# Patient Record
Sex: MALE | Race: WHITE | NOT HISPANIC OR LATINO | Employment: OTHER | URBAN - METROPOLITAN AREA
[De-identification: names, ages, dates, MRNs, and addresses within clinical notes are randomized per-mention and may not be internally consistent; named-entity substitution may affect disease eponyms.]

---

## 2018-07-24 ENCOUNTER — OFFICE VISIT CONVERTED (OUTPATIENT)
Dept: FAMILY MEDICINE CLINIC | Facility: CLINIC | Age: 68
End: 2018-07-24
Attending: PHYSICIAN ASSISTANT

## 2018-10-16 ENCOUNTER — CONVERSION ENCOUNTER (OUTPATIENT)
Dept: FAMILY MEDICINE CLINIC | Facility: CLINIC | Age: 68
End: 2018-10-16

## 2018-10-16 ENCOUNTER — OFFICE VISIT CONVERTED (OUTPATIENT)
Dept: FAMILY MEDICINE CLINIC | Facility: CLINIC | Age: 68
End: 2018-10-16
Attending: PHYSICIAN ASSISTANT

## 2018-10-24 ENCOUNTER — OFFICE VISIT CONVERTED (OUTPATIENT)
Dept: SURGERY | Facility: CLINIC | Age: 68
End: 2018-10-24
Attending: NURSE PRACTITIONER

## 2019-04-22 ENCOUNTER — CONVERSION ENCOUNTER (OUTPATIENT)
Dept: FAMILY MEDICINE CLINIC | Facility: CLINIC | Age: 69
End: 2019-04-22

## 2019-04-22 ENCOUNTER — OFFICE VISIT CONVERTED (OUTPATIENT)
Dept: FAMILY MEDICINE CLINIC | Facility: CLINIC | Age: 69
End: 2019-04-22
Attending: PHYSICIAN ASSISTANT

## 2019-04-30 ENCOUNTER — OFFICE VISIT CONVERTED (OUTPATIENT)
Dept: FAMILY MEDICINE CLINIC | Facility: CLINIC | Age: 69
End: 2019-04-30
Attending: PHYSICIAN ASSISTANT

## 2019-04-30 ENCOUNTER — CONVERSION ENCOUNTER (OUTPATIENT)
Dept: FAMILY MEDICINE CLINIC | Facility: CLINIC | Age: 69
End: 2019-04-30

## 2019-07-26 ENCOUNTER — OFFICE VISIT CONVERTED (OUTPATIENT)
Dept: FAMILY MEDICINE CLINIC | Facility: CLINIC | Age: 69
End: 2019-07-26
Attending: PHYSICIAN ASSISTANT

## 2019-08-13 ENCOUNTER — OFFICE VISIT CONVERTED (OUTPATIENT)
Dept: FAMILY MEDICINE CLINIC | Facility: CLINIC | Age: 69
End: 2019-08-13
Attending: PHYSICIAN ASSISTANT

## 2019-08-13 ENCOUNTER — CONVERSION ENCOUNTER (OUTPATIENT)
Dept: FAMILY MEDICINE CLINIC | Facility: CLINIC | Age: 69
End: 2019-08-13

## 2019-08-23 ENCOUNTER — OFFICE VISIT CONVERTED (OUTPATIENT)
Dept: FAMILY MEDICINE CLINIC | Facility: CLINIC | Age: 69
End: 2019-08-23
Attending: PHYSICIAN ASSISTANT

## 2019-08-23 ENCOUNTER — HOSPITAL ENCOUNTER (OUTPATIENT)
Dept: GENERAL RADIOLOGY | Facility: HOSPITAL | Age: 69
Discharge: HOME | End: 2019-08-23
Attending: PHYSICIAN ASSISTANT

## 2019-09-30 ENCOUNTER — OFFICE VISIT CONVERTED (OUTPATIENT)
Dept: NEUROLOGY | Facility: CLINIC | Age: 69
End: 2019-09-30
Attending: PSYCHIATRY & NEUROLOGY

## 2019-09-30 ENCOUNTER — OFFICE VISIT CONVERTED (OUTPATIENT)
Dept: FAMILY MEDICINE CLINIC | Facility: CLINIC | Age: 69
End: 2019-09-30
Attending: PHYSICIAN ASSISTANT

## 2019-10-01 ENCOUNTER — CONVERSION ENCOUNTER (OUTPATIENT)
Dept: CARDIOLOGY | Facility: CLINIC | Age: 69
End: 2019-10-01
Attending: INTERNAL MEDICINE

## 2019-10-18 ENCOUNTER — HOSPITAL ENCOUNTER (OUTPATIENT)
Dept: MRI IMAGING | Facility: HOSPITAL | Age: 69
Discharge: HOME | End: 2019-10-18
Attending: PSYCHIATRY & NEUROLOGY

## 2019-10-28 ENCOUNTER — CONVERSION ENCOUNTER (OUTPATIENT)
Dept: NEUROLOGY | Facility: CLINIC | Age: 69
End: 2019-10-28

## 2019-12-02 ENCOUNTER — CONVERSION ENCOUNTER (OUTPATIENT)
Dept: FAMILY MEDICINE CLINIC | Facility: CLINIC | Age: 69
End: 2019-12-02

## 2019-12-02 ENCOUNTER — OFFICE VISIT CONVERTED (OUTPATIENT)
Dept: FAMILY MEDICINE CLINIC | Facility: CLINIC | Age: 69
End: 2019-12-02
Attending: PHYSICIAN ASSISTANT

## 2019-12-04 ENCOUNTER — HOSPITAL ENCOUNTER (OUTPATIENT)
Dept: SLEEP MEDICINE | Facility: HOSPITAL | Age: 69
Discharge: HOME | End: 2019-12-04
Attending: PSYCHIATRY & NEUROLOGY

## 2019-12-06 ENCOUNTER — OFFICE VISIT CONVERTED (OUTPATIENT)
Dept: NEUROLOGY | Facility: CLINIC | Age: 69
End: 2019-12-06
Attending: PSYCHIATRY & NEUROLOGY

## 2019-12-19 ENCOUNTER — HOSPITAL ENCOUNTER (OUTPATIENT)
Dept: SLEEP MEDICINE | Facility: HOSPITAL | Age: 69
Discharge: HOME | End: 2019-12-19
Attending: PSYCHIATRY & NEUROLOGY

## 2020-02-18 ENCOUNTER — HOSPITAL ENCOUNTER (OUTPATIENT)
Dept: SLEEP MEDICINE | Facility: HOSPITAL | Age: 70
Discharge: HOME | End: 2020-02-18
Attending: PSYCHIATRY & NEUROLOGY

## 2020-04-09 ENCOUNTER — TELEMEDICINE CONVERTED (OUTPATIENT)
Dept: FAMILY MEDICINE CLINIC | Facility: CLINIC | Age: 70
End: 2020-04-09
Attending: PHYSICIAN ASSISTANT

## 2020-04-30 ENCOUNTER — TELEMEDICINE CONVERTED (OUTPATIENT)
Dept: FAMILY MEDICINE CLINIC | Facility: CLINIC | Age: 70
End: 2020-04-30
Attending: PHYSICIAN ASSISTANT

## 2020-05-04 ENCOUNTER — TELEMEDICINE CONVERTED (OUTPATIENT)
Dept: FAMILY MEDICINE CLINIC | Facility: CLINIC | Age: 70
End: 2020-05-04
Attending: PHYSICIAN ASSISTANT

## 2020-05-12 ENCOUNTER — OFFICE VISIT CONVERTED (OUTPATIENT)
Dept: FAMILY MEDICINE CLINIC | Facility: CLINIC | Age: 70
End: 2020-05-12
Attending: PHYSICIAN ASSISTANT

## 2020-05-12 ENCOUNTER — CONVERSION ENCOUNTER (OUTPATIENT)
Dept: FAMILY MEDICINE CLINIC | Facility: CLINIC | Age: 70
End: 2020-05-12

## 2020-08-14 ENCOUNTER — OFFICE VISIT CONVERTED (OUTPATIENT)
Dept: FAMILY MEDICINE CLINIC | Facility: CLINIC | Age: 70
End: 2020-08-14
Attending: PHYSICIAN ASSISTANT

## 2020-09-21 ENCOUNTER — OFFICE VISIT CONVERTED (OUTPATIENT)
Dept: FAMILY MEDICINE CLINIC | Facility: CLINIC | Age: 70
End: 2020-09-21
Attending: NURSE PRACTITIONER

## 2020-09-21 ENCOUNTER — HOSPITAL ENCOUNTER (OUTPATIENT)
Dept: GENERAL RADIOLOGY | Facility: HOSPITAL | Age: 70
Discharge: HOME | End: 2020-09-21
Attending: NURSE PRACTITIONER

## 2020-09-28 ENCOUNTER — HOSPITAL ENCOUNTER (OUTPATIENT)
Dept: PREADMISSION TESTING | Facility: HOSPITAL | Age: 70
Discharge: HOME | End: 2020-09-28
Attending: PODIATRIST

## 2020-09-28 ENCOUNTER — OFFICE VISIT CONVERTED (OUTPATIENT)
Dept: PODIATRY | Facility: CLINIC | Age: 70
End: 2020-09-28
Attending: PODIATRIST

## 2020-09-28 LAB — SARS-COV-2 RNA SPEC QL NAA+PROBE: NOT DETECTED

## 2020-09-29 ENCOUNTER — HOSPITAL ENCOUNTER (OUTPATIENT)
Dept: PREADMISSION TESTING | Facility: HOSPITAL | Age: 70
Discharge: HOME | End: 2020-09-29
Attending: PODIATRIST

## 2020-10-02 ENCOUNTER — HOSPITAL ENCOUNTER (OUTPATIENT)
Dept: PERIOP | Facility: HOSPITAL | Age: 70
Setting detail: HOSPITAL OUTPATIENT SURGERY
Discharge: HOME | End: 2020-10-02
Attending: PODIATRIST

## 2020-10-07 ENCOUNTER — CONVERSION ENCOUNTER (OUTPATIENT)
Dept: PODIATRY | Facility: CLINIC | Age: 70
End: 2020-10-07

## 2020-10-07 ENCOUNTER — OFFICE VISIT CONVERTED (OUTPATIENT)
Dept: PODIATRY | Facility: CLINIC | Age: 70
End: 2020-10-07
Attending: PODIATRIST

## 2020-10-21 ENCOUNTER — OFFICE VISIT CONVERTED (OUTPATIENT)
Dept: PODIATRY | Facility: CLINIC | Age: 70
End: 2020-10-21
Attending: PODIATRIST

## 2020-10-21 ENCOUNTER — HOSPITAL ENCOUNTER (OUTPATIENT)
Dept: GENERAL RADIOLOGY | Facility: HOSPITAL | Age: 70
Discharge: HOME | End: 2020-10-21
Attending: PODIATRIST

## 2020-11-04 ENCOUNTER — OFFICE VISIT CONVERTED (OUTPATIENT)
Dept: PODIATRY | Facility: CLINIC | Age: 70
End: 2020-11-04
Attending: PODIATRIST

## 2020-11-04 ENCOUNTER — HOSPITAL ENCOUNTER (OUTPATIENT)
Dept: GENERAL RADIOLOGY | Facility: HOSPITAL | Age: 70
Discharge: HOME | End: 2020-11-04
Attending: PODIATRIST

## 2020-11-12 ENCOUNTER — OFFICE VISIT CONVERTED (OUTPATIENT)
Dept: NEUROLOGY | Facility: CLINIC | Age: 70
End: 2020-11-12
Attending: PSYCHIATRY & NEUROLOGY

## 2020-11-18 ENCOUNTER — OFFICE VISIT CONVERTED (OUTPATIENT)
Dept: PODIATRY | Facility: CLINIC | Age: 70
End: 2020-11-18
Attending: PODIATRIST

## 2020-11-18 ENCOUNTER — HOSPITAL ENCOUNTER (OUTPATIENT)
Dept: GENERAL RADIOLOGY | Facility: HOSPITAL | Age: 70
Discharge: HOME | End: 2020-11-18
Attending: PODIATRIST

## 2020-11-18 ENCOUNTER — CONVERSION ENCOUNTER (OUTPATIENT)
Dept: PODIATRY | Facility: CLINIC | Age: 70
End: 2020-11-18

## 2020-12-09 ENCOUNTER — OFFICE VISIT CONVERTED (OUTPATIENT)
Dept: PODIATRY | Facility: CLINIC | Age: 70
End: 2020-12-09
Attending: PODIATRIST

## 2020-12-09 ENCOUNTER — HOSPITAL ENCOUNTER (OUTPATIENT)
Dept: GENERAL RADIOLOGY | Facility: HOSPITAL | Age: 70
Discharge: HOME | End: 2020-12-09
Attending: PODIATRIST

## 2021-02-15 ENCOUNTER — TELEMEDICINE CONVERTED (OUTPATIENT)
Dept: FAMILY MEDICINE CLINIC | Facility: CLINIC | Age: 71
End: 2021-02-15
Attending: PHYSICIAN ASSISTANT

## 2021-02-24 ENCOUNTER — HOSPITAL ENCOUNTER (OUTPATIENT)
Dept: LAB | Facility: HOSPITAL | Age: 71
Discharge: HOME | End: 2021-02-24
Attending: PHYSICIAN ASSISTANT

## 2021-02-24 LAB
ALBUMIN SERPL-MCNC: 4.2 G/DL (ref 3.5–5)
ALBUMIN/GLOB SERPL: 1.6 {RATIO} (ref 1.4–2.6)
ALP SERPL-CCNC: 71 U/L (ref 56–155)
ALT SERPL-CCNC: 17 U/L (ref 10–40)
ANION GAP SERPL CALC-SCNC: 16 MMOL/L (ref 8–19)
APPEARANCE UR: CLEAR
AST SERPL-CCNC: 20 U/L (ref 15–50)
BASOPHILS # BLD AUTO: 0.05 10*3/UL (ref 0–0.2)
BASOPHILS NFR BLD AUTO: 0.7 % (ref 0–3)
BILIRUB SERPL-MCNC: 0.51 MG/DL (ref 0.2–1.3)
BILIRUB UR QL: NEGATIVE
BUN SERPL-MCNC: 12 MG/DL (ref 5–25)
BUN/CREAT SERPL: 11 {RATIO} (ref 6–20)
CALCIUM SERPL-MCNC: 8.9 MG/DL (ref 8.7–10.4)
CHLORIDE SERPL-SCNC: 107 MMOL/L (ref 99–111)
CHOLEST SERPL-MCNC: 227 MG/DL (ref 107–200)
CHOLEST/HDLC SERPL: 6 {RATIO} (ref 3–6)
COLOR UR: YELLOW
CONV ABS IMM GRAN: 0.05 10*3/UL (ref 0–0.2)
CONV CO2: 22 MMOL/L (ref 22–32)
CONV COLLECTION SOURCE (UA): NORMAL
CONV IMMATURE GRAN: 0.7 % (ref 0–1.8)
CONV TOTAL PROTEIN: 6.9 G/DL (ref 6.3–8.2)
CONV UROBILINOGEN IN URINE BY AUTOMATED TEST STRIP: 1 {EHRLICHU}/DL (ref 0.1–1)
CREAT UR-MCNC: 1.11 MG/DL (ref 0.7–1.2)
DEPRECATED RDW RBC AUTO: 44.9 FL (ref 35.1–43.9)
EOSINOPHIL # BLD AUTO: 0.34 10*3/UL (ref 0–0.7)
EOSINOPHIL # BLD AUTO: 4.5 % (ref 0–7)
ERYTHROCYTE [DISTWIDTH] IN BLOOD BY AUTOMATED COUNT: 13 % (ref 11.6–14.4)
GFR SERPLBLD BASED ON 1.73 SQ M-ARVRAT: >60 ML/MIN/{1.73_M2}
GLOBULIN UR ELPH-MCNC: 2.7 G/DL (ref 2–3.5)
GLUCOSE SERPL-MCNC: 83 MG/DL (ref 70–99)
GLUCOSE UR QL: NEGATIVE MG/DL
HCT VFR BLD AUTO: 46.4 % (ref 42–52)
HDLC SERPL-MCNC: 38 MG/DL (ref 40–60)
HGB BLD-MCNC: 15 G/DL (ref 14–18)
HGB UR QL STRIP: NEGATIVE
KETONES UR QL STRIP: NEGATIVE MG/DL
LDLC SERPL CALC-MCNC: 162 MG/DL (ref 70–100)
LEUKOCYTE ESTERASE UR QL STRIP: NEGATIVE
LYMPHOCYTES # BLD AUTO: 2.06 10*3/UL (ref 1–5)
LYMPHOCYTES NFR BLD AUTO: 27.3 % (ref 20–45)
MCH RBC QN AUTO: 30.7 PG (ref 27–31)
MCHC RBC AUTO-ENTMCNC: 32.3 G/DL (ref 33–37)
MCV RBC AUTO: 94.9 FL (ref 80–96)
MONOCYTES # BLD AUTO: 0.77 10*3/UL (ref 0.2–1.2)
MONOCYTES NFR BLD AUTO: 10.2 % (ref 3–10)
NEUTROPHILS # BLD AUTO: 4.28 10*3/UL (ref 2–8)
NEUTROPHILS NFR BLD AUTO: 56.6 % (ref 30–85)
NITRITE UR QL STRIP: NEGATIVE
NRBC CBCN: 0 % (ref 0–0.7)
OSMOLALITY SERPL CALC.SUM OF ELEC: 291 MOSM/KG (ref 273–304)
PH UR STRIP.AUTO: 7 [PH] (ref 5–8)
PLATELET # BLD AUTO: 184 10*3/UL (ref 130–400)
PMV BLD AUTO: 10.6 FL (ref 9.4–12.4)
POTASSIUM SERPL-SCNC: 4.1 MMOL/L (ref 3.5–5.3)
PROT UR QL: NEGATIVE MG/DL
PSA SERPL-MCNC: 1.12 NG/ML (ref 0–4)
RBC # BLD AUTO: 4.89 10*6/UL (ref 4.7–6.1)
SODIUM SERPL-SCNC: 141 MMOL/L (ref 135–147)
SP GR UR: 1.02 (ref 1–1.03)
T4 FREE SERPL-MCNC: 1.1 NG/DL (ref 0.9–1.8)
TRIGL SERPL-MCNC: 135 MG/DL (ref 40–150)
TSH SERPL-ACNC: 1.97 M[IU]/L (ref 0.27–4.2)
VLDLC SERPL-MCNC: 27 MG/DL (ref 5–37)
WBC # BLD AUTO: 7.55 10*3/UL (ref 4.8–10.8)

## 2021-03-04 ENCOUNTER — OFFICE VISIT CONVERTED (OUTPATIENT)
Dept: NEUROLOGY | Facility: CLINIC | Age: 71
End: 2021-03-04
Attending: PSYCHIATRY & NEUROLOGY

## 2021-03-04 ENCOUNTER — HOSPITAL ENCOUNTER (OUTPATIENT)
Dept: LAB | Facility: HOSPITAL | Age: 71
Discharge: HOME | End: 2021-03-04
Attending: PSYCHIATRY & NEUROLOGY

## 2021-03-04 LAB
ERYTHROCYTE [SEDIMENTATION RATE] IN BLOOD: 3 MM/H (ref 0–20)
EST. AVERAGE GLUCOSE BLD GHB EST-MCNC: 114 MG/DL
HBA1C MFR BLD: 5.6 % (ref 3.5–5.7)
VIT B12 SERPL-MCNC: 535 PG/ML (ref 211–911)

## 2021-03-05 LAB
ALBUMIN SERPL-MCNC: 3.9 G/DL (ref 2.9–4.4)
ALBUMIN/GLOB SERPL: 1.1 {RATIO} (ref 0.7–1.7)
ALPHA2 GLOB SERPL ELPH-MCNC: 0.8 G/DL (ref 0.4–1)
BETA GLOBULIN: 1.3 G/DL (ref 0.7–1.3)
CONV ALPHA-1-GLOBULIN: 0.2 G/DL (ref 0–0.4)
CONV IMMUNOGLOBULIN G (IGG): 930 MG/DL (ref 603–1613)
CONV IMMUNOGLOBULIN M (IGM): 55 MG/DL (ref 20–172)
CONV PE INTERPRETATION: NORMAL
CONV PE NOTE: NORMAL
CONV TOTAL PROTEIN: 7.3 G/DL (ref 6–8.5)
GAMMA GLOB SERPL ELPH-MCNC: 1 G/DL (ref 0.4–1.8)
GLOBULIN UR ELPH-MCNC: 3.4 G/DL (ref 2.2–3.9)
IGA SERPL-MCNC: 255 MG/DL (ref 61–437)
M-SPIKE: NORMAL G/DL
PROT PATTERN SERPL IFE-IMP: NORMAL

## 2021-03-06 LAB
DSDNA AB SER-ACNC: NEGATIVE [IU]/ML
ENA AB SER IA-ACNC: NEGATIVE {RATIO}

## 2021-03-09 LAB
B BURGDOR18KD IGG SER QL IB: ABNORMAL
B BURGDOR23KD IGG SER QL IB: PRESENT
B BURGDOR23KD IGM SER QL IB: ABNORMAL
B BURGDOR28KD IGG SER QL IB: ABNORMAL
B BURGDOR30KD IGG SER QL IB: ABNORMAL
B BURGDOR39KD IGG SER QL IB: ABNORMAL
B BURGDOR39KD IGM SER QL IB: ABNORMAL
B BURGDOR41KD IGG SER QL IB: PRESENT
B BURGDOR41KD IGM SER QL IB: ABNORMAL
B BURGDOR45KD IGG SER QL IB: ABNORMAL
B BURGDOR58KD IGG SER QL IB: ABNORMAL
B BURGDOR66KD IGG SER QL IB: ABNORMAL
B BURGDOR93KD IGG CSF QL IB: PRESENT
CONV LYME IGG LINE BLOT INTERPRETATION: NEGATIVE
CONV LYME IGM LINE BLOT INTERPRETATION: NEGATIVE

## 2021-04-26 ENCOUNTER — OFFICE VISIT CONVERTED (OUTPATIENT)
Dept: NEUROLOGY | Facility: CLINIC | Age: 71
End: 2021-04-26
Attending: PSYCHIATRY & NEUROLOGY

## 2021-05-03 ENCOUNTER — OFFICE VISIT CONVERTED (OUTPATIENT)
Dept: FAMILY MEDICINE CLINIC | Facility: CLINIC | Age: 71
End: 2021-05-03
Attending: PHYSICIAN ASSISTANT

## 2021-05-03 ENCOUNTER — HOSPITAL ENCOUNTER (OUTPATIENT)
Dept: GENERAL RADIOLOGY | Facility: HOSPITAL | Age: 71
Discharge: HOME | End: 2021-05-03
Attending: PHYSICIAN ASSISTANT

## 2021-05-05 ENCOUNTER — HOSPITAL ENCOUNTER (OUTPATIENT)
Dept: GENERAL RADIOLOGY | Facility: HOSPITAL | Age: 71
Discharge: HOME | End: 2021-05-05
Attending: PSYCHIATRY & NEUROLOGY

## 2021-05-10 NOTE — H&P
History and Physical      Patient Name: Collins Brumfield   Patient ID: [id number]   Sex: Male   YOB: 1950    Primary Care Provider: Lenard Coleman PA-C   Referring Provider: Lenard Coleman PA-C    Visit Date: September 28, 2020    Provider: Melvin Fletcher DPM   Location: [facility]   Location Address: [address] [place] [address]  [id number]   Location Phone: [phone]          Chief Complaint  · Right Foot Pain  · Surgical History and Physical      History Of Present Illness  Collins Brumfield is a 70 year old /White male who presents to the [facility] today new patient referred from Lenard Coleman PA-C.   Patient Name: Collins Brumfield   Allergies: ALLERGY LIST   Cheif Complaint/HPI: INPUT BOX   Current Medicaitons: MEDICATION LIST   Past Medical History: PAST MEDICAL HISTORY   Relevent Social History: None   Tobacco Use: Does not smoke   Psychological History: PSYCHOLOGICAL HISTORY   HPI     New, Established, New Problem:  new  Location:  Right 5th metatarsal  Duration:  21 Sept 2020  Onset:  acute, trauma, fell down steps  Nature:  sore, sharp  Stable, worsening, improving:  worsening    Aggravating factors:   any weightbearing  Previous Treatment:  x-ray    Patient denies any fevers, chills, nausea, vomiting, shortness of breathe, nor any other constitutional signs nor symptoms.    Patient states that they retired from [employer].         Past Medical History  ***No Significant Medical History; Allergic rhinitis due to allergen; Asthma; Erectile dysfunction; Foot fracture, right; Heel pain; Ingrown toenail; Insomnia, unspecified; Lumbago/low back pain; Lumbar Spinal Stenosis; Sleep apnea; Synovial cyst, unspecified         Past Surgical History  Back surgery; Back Surgery, Lumbar; Colonoscopy; Hand surgery; Lumbar laminectomy; Tonsilectomy         Medication List  tadalafil 20 mg oral tablet         Allergy List  NO KNOWN DRUG ALLERGIES       Allergies  Reconciled  Family Medical History  Family history of cancer         Social History  Alcohol (Current some day); lives with spouse; ; No known infection risk; Retired; Tobacco (Never)         Immunizations  Name Date Admin   Hepatitis A    Hepatitis A    Influenza Refused   [id number] Refused   Prevnar 13 Refused   Shingles Refused   Tdap          Review of Systems  · Constitutional  o Denies  o : fatigue, night sweats  · Eyes  o Denies  o : double vision, blurred vision  · HENT  o Denies  o : vertigo, recent head injury  · Cardiovascular  o Denies  o : chest pain, irregular heart beats  · Respiratory  o Denies  o : shortness of breath, productive cough  · Gastrointestinal  o Denies  o : nausea, vomiting  · Genitourinary  o Denies  o : dysuria, urinary retention  · Integument  o Denies  o : hair growth change, new skin lesions  · Neurologic  o Denies  o : altered mental status, seizures  · Musculoskeletal  o * See HPI  · Endocrine  o Denies  o : cold intolerance, heat intolerance  · Heme-Lymph  o Denies  o : petechiae, lymph node enlargement or tenderness  · Allergic-Immunologic  o Denies  o : frequent illnesses      Vitals  Date Time BP Position Site L\R Cuff Size HR RR TEMP (F) WT  HT  BMI kg/m2 BSA m2 O2 Sat HC       09/28/2020 08:10 /69 Sitting    87 - R  97.6 248lbs 0oz 6'   33.63 2.39 96 %          Physical Examination  · Constitutional  o Appearance  o : overweight, well developed  · Cardiovascular  o Peripheral Vascular System  o :   § Pedal Pulses  § : pulses 2 + and symmetrical  § Extremities  § : no edema in lower extremities  · Musculoskeletal  o General  o :   § General Musculoskeletal  § : Lower extremity muscle and strength and range of motion is equal and symmetrical bilaterally. The knees are noted to be normal in alignment. Ankle alignment and range of motion is notmral and foot structure is normal. Subtalar, metatarsal and metatarsal-phalangeal range of motion is noted to be within  normal limits. The digits of both feet are in normal alignment. The gait is normal. Positive tenderness to palpation to the Right 5th metatarsal area. Local edema. No signs of erythema, lymphangitis, nor signs of infection.   · Skin and Subcutaneous Tissue  o General Inspection  o : Skin is noted to have normal texture and turgor, with no excrescences noted.   o Digits and Nails  o : The toenails are noted to be without disese.  · Neurologic  o Sensation  o : Epicritic sensations intact bilaterally.     IMAGING:  3 views, AP, MO, LATERAL; displaced 5th metatarsal shaft on Right; worsening compared to previous views on 21 September 2020.           Assessment  · Foot pain, right     729.5/M79.671  · Metatarsal boss of right foot     726.91/M25.774  · Preoperative examination     V72.84/Z01.818      Plan  · Orders  o JUANIS Report (KASPR) - - 09/28/2020  o Foot (Right) 3 or more views X-Ray [facility] Preferred View (52405-BE) - - 09/28/2020  o [facility] Pre-Op Covid-19 Screening (99415) - - 09/28/2020  o Application of short leg cast (58710) - - 09/28/2020  o Insertion of internal fixation device into right metatarsal, open approach (64716) - - 09/28/2020  · Medications  o Medications have been Reconciled  o Transition of Care or Provider Policy  · Instructions  o PLAN: Right Fifth Metatarsal Fracture ORIF with fiberglass splint casting  o Handouts Provided-Pre-Procedure Instructions including date and time and location of procedure.  o Surgical Facility: [facility]  o ****Surgical Orders****  o ****Patient Status****  o Outpatient  o ********************  o RISK AND BENEFITS:  o Consent for surgery: Given these options, the patient has verbally expressed an understanding of the risks of surgery and finds these risks acceptable. We will proceed with surgery as soon as possible.  o Consult Anesthesia for an post-operative block, or any pain management procedure deemed necessary by the anestesiologist for adequate  post-operative pain control.   o O.R. PREP: Per protocol  o IV: Per Anesthesia  o IV: LR@ 75ml/hr  o PLEASE SIGN PERMIT FOR: Right Fifth Metatarsal Fracture Correction  o *******************************************  o PRE- OP MEDICATION ORDER:  o *******************************************  o *__Kefzol 2 gram IV on call to OR.  o *___The above History and Physical Examination has been completed within 30 days of admission.  o Pre-Admission Testing Date:   o Follow up post surgery in 4 days.  o Discuss Findings: I have discussed the findings of this evaluation with the patient. The discussion included a complete verbal explanation of any changes in the examination results, diagnosis, and the current treatment plan. A schedule for future care needs was explained. If any questions should arise after returning home, I have encouraged the patient to feel free to contact Dr. Fletcher. The patient states understanding and agreement with this plan.  o Local with MAC, Supine, [place] 28  o CAM walker applied to Right lower leg. Pt instructed to utilize for partial-weight bearing at all time with CAM walker. Dr. Fletcher advised patient may resume driving, but advised not to drive while wearing anambulatory device. Advised quick/hard depression of brakes could cause injury to areas. Also advised of possible legal implications while driving in restrictive device. The patient states understanding and agreement with these instructions.  o Patient not weight bear to the affected foot at this time. Patient states understanding and agreement with this plan.  o Electronically Identified Patient Education Materials Provided Electronically  · Disposition  o Call or Return if symptoms worsen or persist.            Electronically Signed by: Melvin Fletcher DPM -Author on September 28, 2020 10:44:57 AM

## 2021-05-12 NOTE — PROGRESS NOTES
Progress Note      Patient Name: Collins Brumfield   Patient ID: [id number]   Sex: Male   YOB: 1950    Primary Care Provider: Lenard Coleman PA-C   Referring Provider: Lenard Coleman PA-C    Visit Date: April 30, 2020    Provider: Lenard Coleman PA-C   Location: [facility]   Location Address: [address] [place] [address], [address]  [place], [place]  [id number]   Location Phone: [phone]          Chief Complaint  · Cough, congestion, wheezing      History Of Present Illness  Video Conferencing Visit  Collins Brumfield is a 70 year old /White male who is presenting for evaluation via video conferencing. Verbal consent obtained before beginning visit.   The following staff were present during this visit: Iman Gomez CMA/Lenard Coleman PA-C   Collins Brumfield is a 70 year old /White male who presents for evaluation and treatment of: Cough, congestion and wheezing.      pt presents today for cough, congestion and wheezing.  pt was seen on 4/9/20 for bronchitis and was given a zpak.  pt stated he is still not feeling any better.    pt has denied having any fever or SOA.       Past Medical History  Disease Name Date Onset Notes   ***No Significant Medical History --  --    Allergic rhinitis due to allergen 04/30/2020 --    Asthma 04/30/2020 --    Insomnia, unspecified 07/26/2019 --    Lumbago/low back pain 06/02/2016 --    Lumbar Spinal Stenosis --  --    Sleep apnea 09/30/2019 --    Synovial cyst, unspecified 06/02/2016 right L2/3          Past Surgical History  Procedure Name Date Notes   Back surgery --  --    Back Surgery, Lumbar 8 years? --    Colonoscopy 2018 normal   Hand surgery --  --    Lumbar laminectomy 5/16/2016 L2-3   Tonsilectomy --  --          Medication List  Name Date Started Instructions   Advair -21 mcg/actuation inhalation HFA aerosol inhaler 04/30/2020 inhale 2 puffs by inhalation route 2 times per day in the morning and evening   Viagra 100 mg oral tablet 04/09/2020 take 1  tablet (100 mg) by oral route once daily as needed approximately 1 hour before sexual activity         Allergy List  Allergen Name Date Reaction Notes   NO KNOWN DRUG ALLERGIES --  --  --        Allergies Reconciled  Family Medical History  Disease Name Relative/Age Notes   Family history of cancer Brother/  Father/   Father; Brother         Social History  Finding Status Start/Stop Quantity Notes   Alcohol Current some day --/-- --  12/06/2019 - 10/28/2019 - rarely drinks, less than 1 drink per day   lives with spouse --  --/-- --  --     --  --/-- --  --    No known infection risk --  --/-- --  --    Retired --  --/-- --  --    Tobacco Never --/-- --  12/06/2019 - 10/28/2019 - never smoker         Immunizations  NameDate Admin Mfg Trade Name Lot Number Route Inj VIS Given VIS Publication   Hepatitis A04/22/2019 SKB HAVRIX-ADULT [id number]  RA 04/22/2019    Comments:    Hepatitis A07/24/2018 SKB HAVRIX-ADULT  IM  07/24/2018 07/20/2016   Comments: ndc [id number] tolerated well   InfluenzaRefused 04/22/2019 NE Not Entered  NE NE     Comments: pt refused   Fryxngzsy59Imrzvcl 04/22/2019 NE Not Entered  NE NE     Comments: pt refused   Prevnar 13Refused 04/22/2019 NE Not Entered  NE NE     Comments: pt refused   ShinglesRefused 04/22/2019 NE Not Entered  NE NE     Comments: pt refused   TdapUnknown NE Not Entered  IM NE 04/22/2019    Comments: pt unknown last inj         Review of Systems  · Constitutional  o Denies  o : fever, fatigue, weight loss, weight gain  · Cardiovascular  o Denies  o : lower extremity edema, claudication, chest pressure, palpitations  · Respiratory  o Admits  o : wheezing, cough  o Denies  o : shortness of breath, hemoptysis, dyspnea on exertion  · Gastrointestinal  o Denies  o : nausea, vomiting, diarrhea, constipation, abdominal pain      Physical Examination  · Constitutional  o Appearance  o : well-nourished, no acute distress  · Respiratory  o Respiratory Effort  o : breathing  comfortably, no cough  · Skin and Subcutaneous Tissue  o General Inspection  o : no visible rash, no lesions  · Neurologic  o Mental Status Examination  o :   § Orientation  § : grossly oriented to person, place and time, no facial droop          Assessment  · Allergic rhinitis due to allergen     477.9/J30.9  · Asthma     493.90/J45.909  · Cough     786.2/R05      Plan  · Orders  o ACO-39: Current medications updated and reviewed () - - 04/30/2020  o ACO - Pt declines to or was not able to provide an Advance Care Plan or name a Surrogate Decision Maker (1124F) - - 04/30/2020  · Medications  o Symbicort 160-4.5 mcg/actuation inhalation HFA aerosol inhaler   SIG: inhale 2 puffs by inhalation route 2 times per day in the morning and evening   DISP: (3) 6 gm aer w/adap with 1 refills  Prescribed on 04/30/2020     o Medrol (Pramod) 4 mg oral tablets,dose pack   SIG: take by oral route as directed per package instructions   DISP: (1) 21 ct dose-pack with 0 refills  Prescribed on 04/30/2020     o Singulair 10 mg oral tablet   SIG: take 1 tablet (10 mg) by oral route once daily in the evening   DISP: (90) tablets with 1 refills  Prescribed on 04/30/2020     o albuterol sulfate 90 mcg/actuation inhalation HFA aerosol inhaler   SIG: inhale 1 - 2 puffs (90 - 180 mcg) by inhalation route every 6 hours as needed   DISP: (3) 6.7 gm canister with 1 refills  Prescribed on 04/30/2020     o Medications have been Reconciled  o Transition of Care or Provider Policy  · Instructions  o Take all medications as prescribed/directed.  o Patient was educated/instructed on their diagnosis, treatment and medications prior to discharge from the clinic today.  o Discussed Covid-19 precautions including, but not limited to, social distancing, avoid touching your face, and hand washing.   · Disposition  o Call or Return if symptoms worsen or persist.  o F/U 1 week  o Care Transition            Electronically Signed by: Lenard Coleman PA-C -Author  on April 30, 2020 08:14:48 PM

## 2021-05-12 NOTE — PROGRESS NOTES
Progress Note      Patient Name: Collins Brumfield   Patient ID: [id number]   Sex: Male   YOB: 1950    Primary Care Provider: Lenard Coleman PA-C   Referring Provider: Lenard Coleman PA-C    Visit Date: April 9, 2020    Provider: Lenard Coleman PA-C   Location: [facility]   Location Address: [address] [place] [address], [address]  [place], [place]  [id number]   Location Phone: [phone]          Chief Complaint  · pt presents today for cough and congestion      History Of Present Illness  Collins Brumfield is a 70 year old /White male who presents for evaluation and treatment of: cough and congestion.   Video Conferencing Visit  Collins Brumfield is a 70 year old /White male who is presenting for evaluation via video conferencing. Verbal consent obtained before beginning visit.   The following staff were present during this visit: Iman Gomez CMA/Lenard Coleman PA-C      this is not related to the recent viral outbreak. Patient feels like he is got just a bronchitis.  pt presents today for cough and congestion, stated high in chest; started around April 1.    no SOA or fever.    pt just returned from [place] 7 days ago    Patient wants a prescription for his erectile dysfunction medication.  He is not on nitroglycerin.           Past Medical History  Disease Name Date Onset Notes   ***No Significant Medical History --  --    Insomnia, unspecified 07/26/2019 --    Lumbago/low back pain 06/02/2016 --    Lumbar Spinal Stenosis --  --    Sleep apnea 09/30/2019 --    Synovial cyst, unspecified 06/02/2016 right L2/3          Past Surgical History  Procedure Name Date Notes   Back surgery --  --    Back Surgery, Lumbar 8 years? --    Colonoscopy 2018 normal   Hand surgery --  --    Lumbar laminectomy 5/16/2016 L2-3   Tonsilectomy --  --          Medication List  Name Date Started Instructions   Viagra 100 mg oral tablet 04/09/2020 take 1 tablet (100 mg) by oral route once daily as needed approximately 1 hour  before sexual activity   Zithromax Z-Pramod 250 mg oral tablet 04/09/2020 take 2 tablets (500 mg) by oral route once daily for 1 day then 1 tablet (250 mg) by oral route once daily for 4 days         Allergy List  Allergen Name Date Reaction Notes   NO KNOWN DRUG ALLERGIES --  --  --          Family Medical History  Disease Name Relative/Age Notes   Family history of cancer Brother/  Father/   Father; Brother         Social History  Finding Status Start/Stop Quantity Notes   Alcohol Current some day --/-- --  12/06/2019 - 10/28/2019 - rarely drinks, less than 1 drink per day   lives with spouse --  --/-- --  --     --  --/-- --  --    No known infection risk --  --/-- --  --    Retired --  --/-- --  --    Tobacco Never --/-- --  12/06/2019 - 10/28/2019 - never smoker         Immunizations  NameDate Admin Mfg Trade Name Lot Number Route Inj VIS Given VIS Publication   Hepatitis A04/22/2019 SKB HAVRIX-ADULT [id number] IM RA 04/22/2019    Comments:    Hepatitis A07/24/2018 SKB HAVRIX-ADULT  IM  07/24/2018 07/20/2016   Comments: ndc [id number] tolerated well   InfluenzaRefused 04/22/2019 NE Not Entered  NE NE     Comments: pt refused   Yllhioyiw79Qmsdfhr 04/22/2019 NE Not Entered  NE NE     Comments: pt refused   Prevnar 13Refused 04/22/2019 NE Not Entered  NE NE     Comments: pt refused   ShinglesRefused 04/22/2019 NE Not Entered  NE NE     Comments: pt refused   TdapUnknown NE Not Entered  IM NE 04/22/2019    Comments: pt unknown last inj         Review of Systems  · Constitutional  o Denies  o : fever, fatigue, weight loss, weight gain  · Cardiovascular  o Denies  o : lower extremity edema, claudication, chest pressure, palpitations  · Respiratory  o Admits  o : cough  o Denies  o : shortness of breath, wheezing, hemoptysis, dyspnea on exertion  · Gastrointestinal  o Denies  o : nausea, vomiting, diarrhea, constipation, abdominal pain      Physical Examination  · Constitutional  o Appearance  o :  well-nourished, no acute distress  · Respiratory  o Respiratory Effort  o : breathing comfortably, no cough  · Skin and Subcutaneous Tissue  o General Inspection  o : no visible rash, no lesions  · Neurologic  o Mental Status Examination  o :   § Orientation  § : grossly oriented to person, place and time, no facial droop          Assessment  · Bronchitis, acute     466.0/J20.9  · Erectile dysfunction     607.84/N52.9      Plan  · Orders  o ACO-39: Current medications updated and reviewed () - - 04/09/2020  · Medications  o Medications have been Reconciled  o Transition of Care or Provider Policy  · Instructions  o Take all medications as prescribed/directed.  o Patient instructed/educated on their diet and exercise program.  o Patient was educated/instructed on their diagnosis, treatment and medications prior to discharge from the clinic today.  o Discussed Covid-19 precautions including, but not limited to, social distancing, avoid touching your face, and hand washing.   o As a precaution patient should self quarantine for 14 days  · Disposition  o Call or Return if symptoms worsen or persist.  o Care Transition            Electronically Signed by: Lenard Coleman PA-C -Author on April 10, 2020 06:04:07 AM

## 2021-05-13 NOTE — PROGRESS NOTES
Progress Note      Patient Name: Collins Brumfield   Patient ID: [id number]   Sex: Male   YOB: 1950    Primary Care Provider: Lenard Coleman PA-C   Referring Provider: Lenard Coleman PA-C    Visit Date: November 4, 2020    Provider: Melvin Fletcher DPM   Location: [facility]   Location Address: [address] [place] [address]  [id number]   Location Phone: [phone]          Chief Complaint  · Follow Up Office Visit S/P Surgery      History Of Present Illness  Collins Brumfield is a 70 year old /White male who presents today for a postoperative visit.      Procedure: ORIF of right fifth metatarsal shaft  Date: 2 October 2020    Patient states they are doing well without complications.  Patient states they are following post-op instructions.  Patient states pain is controlled.      Patient denies any fevers, chills, nausea, vomiting, shortness of breathe, nor any other constitutional signs nor symptoms.      Patient relates no medical changes since their last visit.           Past Medical History  ***No Significant Medical History; Allergic rhinitis due to allergen; Asthma; Erectile dysfunction; Foot fracture, right; Heel pain; Ingrown toenail; Insomnia, unspecified; Lumbago/low back pain; Lumbar Spinal Stenosis; Sleep apnea; Surgery follow-up; Synovial cyst, unspecified         Past Surgical History  Back surgery; Back Surgery, Lumbar; Colonoscopy; Hand surgery; Lumbar laminectomy; Tonsilectomy         Medication List  tadalafil 20 mg oral tablet         Allergy List  NO KNOWN DRUG ALLERGIES       Allergies Reconciled  Family Medical History  Family history of cancer         Social History  Alcohol (Current some day); lives with spouse; ; No known infection risk; Retired; Tobacco (Never)         Immunizations  Name Date Admin   Hepatitis A 04/22/2019   Hepatitis A 07/24/2018   Influenza Refused   [id number] Refused   Prevnar 13 Refused   Shingles Refused   Tdap 04/22/2019          Review of Systems  · Constitutional  o Denies  o : fatigue, night sweats  · Eyes  o Denies  o : double vision, blurred vision  · HENT  o Denies  o : vertigo, recent head injury  · Cardiovascular  o Denies  o : chest pain, irregular heart beats  · Respiratory  o Denies  o : shortness of breath, productive cough  · Gastrointestinal  o Denies  o : nausea, vomiting  · Genitourinary  o Denies  o : dysuria, urinary retention  · Integument  o * See HPI  · Neurologic  o Denies  o : altered mental status, seizures  · Musculoskeletal  o * See HPI  · Endocrine  o Denies  o : cold intolerance, heat intolerance  · Heme-Lymph  o Denies  o : petechiae, lymph node enlargement or tenderness  · Allergic-Immunologic  o Denies  o : frequent illnesses      Vitals  Date Time BP Position Site L\R Cuff Size HR RR TEMP (F) WT  HT  BMI kg/m2 BSA m2 O2 Sat FR L/min FiO2        11/04/2020 02:38 /87 Sitting    94 - R  97.6  6'     97 %            Physical Examination  · Constitutional  o Appearance  o : well developed, well-nourished, no obvious deformities present  · Cardiovascular  o Peripheral Vascular System  o :   § Pedal Pulses  § : pulses 2 + and symmetrical  § Extremities  § : no edema in lower extremities  · Skin and Subcutaneous Tissue  o General Inspection  o : Skin is noted to have normal texture and turgor, with no excrescences noted.   o Digits and Nails  o : The toenails are noted to be without disese.  · Neurologic  o Sensation  o : Epicritic sensations intact bilaterally.  · Right Ankle/Foot  o Inspection  o : Fiberglass below-knee cast and dressing are dry and intact without signs of breakthrough. Sutures intact with skin edges well-coapted with no signs of dehiscence. Healthy surgical skin edges. No drainage present. No edema, erythema, calor, lymphangitis, nor signs of infection seen. Upon removal of sutures, skin edges remain well-coapted with no signs of dehiscence.     Dr. Fletcher reviewed radiographs and  results from [facility] and discussed them with the patient.  These are significant for ORIF in the fifth metatarsal shaft in good anatomical alignment.  No change compared to intraoperative views.  Increase in trabeculation seen across fracture fragments.      Extremity:  Right leg, Short Leg cast.    Patient was placed in fiberglass cast today. The patient tolerated the procedure without any complications., Neurovascular status was evaluated post cast application and was within normal limits. The cast was not causing impingement on neuro-vasculature or vital structures.           Assessment  · Postoperative Exam Following Surgery     V67.00      Plan  · Orders  o Casting Supplies (Short Leg) Adult () - - 11/04/2020  o Xray foot right [facility] Preferred View ([id number]) - - 11/04/2020  o Application of short leg cast (82259) - - 11/04/2020  · Medications  o Medications have been Reconciled  o Transition of Care or Provider Policy  · Instructions  o Follow up in 2 weeks. X-rays. Cast removal. Transition to PWB. No impact x one month.  o Continue nonweightbearing to the right foot. Continue to keep cast dry and intact. The patient states understanding and agreement with this plan.   o Patient to monitor for recurrence of symptoms and to contact Dr. Morris office for a follow-up appointment.  o Dr. Fletcher advised patient may resume driving, but advised not to drive while wearing anambulatory device. Advised quick/hard depression of brakes could cause injury to areas. Also advised of possible legal implications while driving in restrictive device. The patient states understanding and agreement with these instructions.  o Electronically Identified Patient Education Materials Provided Electronically  · Disposition  o Call or Return if symptoms worsen or persist.            Electronically Signed by: Melvin Fletcher DPM -Author on November 4, 2020 02:49:00 PM

## 2021-05-13 NOTE — PROGRESS NOTES
Progress Note      Patient Name: Collins Brumfield   Patient ID: [id number]   Sex: Male   YOB: 1950    Primary Care Provider: Lenard Coleman PA-C   Referring Provider: Lenard Coleman PA-C    Visit Date: August 14, 2020    Provider: Lenard Coleman PA-C   Location: [facility]   Location Address: [address] [place] [address], [address]  [place], [place]  [id number]   Location Phone: [phone]          Chief Complaint  · Routine follow up      History Of Present Illness  Collins Brumfield is a 70 year old /White male who presents for evaluation and treatment of: routine follow up.      pt presents today for routine follow up.    no new issues or complaints to discuss    pt is due for a AWV today    He has only ED as a complaint.  Viagra does not work well for him.    No CP, SOA, HA    Pt is seeing neuro    He has sleep apnea         Past Medical History  Disease Name Date Onset Notes   ***No Significant Medical History --  --    Allergic rhinitis due to allergen 04/30/2020 --    Asthma 04/30/2020 --    Erectile dysfunction 08/16/2020 --    Insomnia, unspecified 07/26/2019 --    Lumbago/low back pain 06/02/2016 --    Lumbar Spinal Stenosis --  --    Sleep Apnea 09/30/2019 --    Synovial cyst, unspecified 06/02/2016 right L2/3          Past Surgical History  Procedure Name Date Notes   Back surgery --  --    Back Surgery, Lumbar 8 years? --    Colonoscopy 2018 normal   Hand surgery --  --    Lumbar laminectomy 5/16/2016 L2-3   Tonsilectomy --  --          Allergy List  Allergen Name Date Reaction Notes   NO KNOWN DRUG ALLERGIES --  --  --          Family Medical History  Disease Name Relative/Age Notes   Family history of cancer Brother/  Father/   Father; Brother         Social History  Finding Status Start/Stop Quantity Notes   Alcohol Current some day --/-- --  12/06/2019 - 10/28/2019 - rarely drinks, less than 1 drink per day   lives with spouse --  --/-- --  --     --  --/-- --  --    No known  infection risk --  --/-- --  --    Retired --  --/-- --  --    Tobacco Never --/-- --  12/06/2019 - 10/28/2019 - never smoker         Immunizations  NameDate Admin Mfg Trade Name Lot Number Route Inj VIS Given VIS Publication   Hepatitis A04/22/2019 SKB HAVRIX-ADULT [id number] IM RA 04/22/2019    Comments:    Hepatitis A07/24/2018 SKB HAVRIX-ADULT  IM  07/24/2018 07/20/2016   Comments: ndc [id number] tolerated well   InfluenzaRefused 04/22/2019 NE Not Entered  NE NE     Comments: pt refused   [id number] 04/22/2019 NE Not Entered  NE NE     Comments: pt refused   Prevnar 13Refused 04/22/2019 NE Not Entered  NE NE     Comments: pt refused   ShinglesRefused 04/22/2019 NE Not Entered  NE NE     Comments: pt refused   TdapUnknown NE Not Entered  IM NE 04/22/2019    Comments: pt unknown last inj         Review of Systems  · Constitutional  o Denies  o : fever, fatigue, weight loss, weight gain  · Cardiovascular  o Denies  o : lower extremity edema, claudication, chest pressure, palpitations  · Respiratory  o Denies  o : shortness of breath, wheezing, cough, hemoptysis, dyspnea on exertion  · Gastrointestinal  o Denies  o : nausea, vomiting, diarrhea, constipation, abdominal pain      Vitals  Date Time BP Position Site L\R Cuff Size HR RR TEMP (F) WT  HT  BMI kg/m2 BSA m2 O2 Sat        08/14/2020 09:24 /70 Sitting    72 - R   253lbs 6oz 6'   34.36 2.42 95 %          Physical Examination  · Constitutional  o Appearance  o : overweight, well developed  · Head and Face  o Head  o : normocephalic, atraumatic  · Ears, Nose, Mouth and Throat  o Ears  o :   § External Ears  § : external auditory canal appearance normal, no discharge present  § Otoscopic Examination  § : tympanic membranes pearly white/gray bilaterally  o Nose  o :   § External Nose  § : no lesions noted  § Nasopharynx  § : no discharge present  o Oral Cavity  o :   § Oral Mucosa  § : oral mucosa light pink  o Throat  o :   § Oropharynx  § :  tonsils without exudate, no palatal petechiae  · Neck  o Inspection/Palpation  o : normal appearance, no masses or tenderness, trachea midline  o Thyroid  o : gland size normal, nontender, no nodules or masses present on palpation  · Respiratory  o Respiratory Effort  o : breathing unlabored  o Inspection of Chest  o : chest rise symmetric bilaterally  o Auscultation of Lungs  o : clear to auscultation bilaterally throughout inspiration and expiration  · Cardiovascular  o Heart  o :   § Auscultation of Heart  § : regular rate and rhythm, no murmurs, gallops or rubs  o Peripheral Vascular System  o :   § Extremities  § : no edema  · Lymphatic  o Neck  o : no cervical lymphadenopathy, no supraclavicular lymphadenopathy  · Psychiatric  o Mood and Affect  o : mood normal, affect appropriate          Assessment  · Annual physical exam     V70.0/Z00.00  · Asthma     493.90/J45.909  · Insomnia, unspecified     780.52/G47.00  · Class 1 obesity due to excess calories with serious comorbidity and body mass index (BMI) of 34.0 to 34.9 in adult       Other obesity due to excess calories     278.00/E66.09  Body mass index (BMI) 34.0-34.9, adult     278.00/Z68.34  · Erectile dysfunction     607.84/N52.9      Plan  · Orders  o Free T4 (01710) - - 08/14/2020  o Male Physical Primary Care Panel (CMP, CBC, TSH, Lipid, PSA) [facility] (27503, 24002, 10657, 23324, 89289, ) - - 08/14/2020  o Urinalysis with Reflex Microscopy if abnormal ([facility]) (57874) - - 08/14/2020  o JUANIS Report (KASPR) - - 08/14/2020  o ACO-39: Current medications updated and reviewed () - - 08/14/2020  o ACO-14: Influenza immunization was not administered for reasons documented () - - 08/14/2020  o Testosterone (Total) (68148) - - 08/14/2020  · Medications  o tadalafil 20 mg oral tablet   SIG: take 1 tablet (20 mg) by oral route as needed approximately 1 hour before sexual activity   DISP: (30) tablets with 0 refills  Prescribed on 08/14/2020     o Viagra  100 mg oral tablet   SIG: take 1 tablet (100 mg) by oral route once daily as needed approximately 1 hour before sexual activity   DISP: (30) tablets with 5 refills  Discontinued on 08/14/2020     o Medications have been Reconciled  o Transition of Care or Provider Policy  · Instructions  o Reviewed health maintenance flowsheet and updated information. Orders were placed and/or patient's response was documented.  o Take all medications as prescribed/directed.  o Patient was educated/instructed on their diagnosis, treatment and medications prior to discharge from the clinic today.  o Discussed Covid-19 precautions including, but not limited to, social distancing, avoid touching your face, and hand washing.   · Disposition  o Call or Return if symptoms worsen or persist.  o F/U in 6 months  o Care Transition            Electronically Signed by: Lenard Coleman PA-C -Author on August 16, 2020 08:33:08 PM

## 2021-05-13 NOTE — PROGRESS NOTES
Progress Note      Patient Name: Collins Brumfield   Patient ID: [id number]   Sex: Male   YOB: 1950    Primary Care Provider: Lenard Coleman PA-C   Referring Provider: Lenard Coleman PA-C    Visit Date: September 21, 2020    Provider: MIKE Cote   Location: [facility]   Location Address: [address] [place] [address], [address]  [place], [place]  [id number]   Location Phone: [phone]          Chief Complaint  · Right foot pain  · left wrist pain      History Of Present Illness  Collins Brumfield is a 70 year old /White male who presents for evaluation and treatment of:      Acute visit for right foot pain s/p falling down stairs at home X2, last night and again this morning.  Patient reports he fell down 2 steps, after loosing his footing.  He states the right foot in injured worse. He is having a difficult time bearing weight. The foot is swollen. Pt states the pain is worse on the lateral aspect and along the side of the foot.  Patient describes the pain as constant, throbbing, worse with walking.  Patient also has bruising/scrape to left wrist.  He is not sure if he fell on the wrist or not. He does have a scrape on the inside of the wrist, but he states he is not in that much pain with the wrist.  Patient denies hitting his head, LOC.       Past Medical History  Disease Name Date Onset Notes   ***No Significant Medical History --  --    Allergic rhinitis due to allergen 04/30/2020 --    Asthma 04/30/2020 --    Erectile dysfunction 08/16/2020 --    Insomnia, unspecified 07/26/2019 --    Lumbago/low back pain 06/02/2016 --    Lumbar Spinal Stenosis --  --    Sleep apnea 09/30/2019 --    Synovial cyst, unspecified 06/02/2016 right L2/3          Past Surgical History  Procedure Name Date Notes   Back surgery --  --    Back Surgery, Lumbar 8 years? --    Colonoscopy 2018 normal   Hand surgery --  --    Lumbar laminectomy 5/16/2016 L2-3   Tonsilectomy --  --          Medication  List  Name Date Started Instructions   tadalafil 20 mg oral tablet 08/14/2020 take 1 tablet (20 mg) by oral route as needed approximately 1 hour before sexual activity         Allergy List  Allergen Name Date Reaction Notes   NO KNOWN DRUG ALLERGIES --  --  --        Allergies Reconciled  Family Medical History  Disease Name Relative/Age Notes   Family history of cancer Brother/  Father/   Father; Brother         Social History  Finding Status Start/Stop Quantity Notes   Alcohol Current some day --/-- --  12/06/2019 - 10/28/2019 - rarely drinks, less than 1 drink per day   lives with spouse --  --/-- --  --     --  --/-- --  --    No known infection risk --  --/-- --  --    Retired --  --/-- --  --    Tobacco Never --/-- --  --          Immunizations  NameDate Admin Mfg Trade Name Lot Number Route Inj VIS Given VIS Publication   Hepatitis A04/22/2019 SKB HAVRIX-ADULT [id number] IM RA 04/22/2019    Comments:    Hepatitis A07/24/2018 SKB HAVRIX-ADULT  IM  07/24/2018 07/20/2016   Comments: ndc [id number] tolerated well   InfluenzaRefused 04/22/2019 NE Not Entered  NE NE     Comments: pt refused   [id number] 04/22/2019 NE Not Entered  NE NE     Comments: pt refused   Prevnar 13Refused 04/22/2019 NE Not Entered  NE NE     Comments: pt refused   ShinglesRefused 04/22/2019 NE Not Entered  NE NE     Comments: pt refused   TdapUnknown NE Not Entered  IM NE 04/22/2019    Comments: pt unknown last inj         Review of Systems  · Constitutional  o Denies  o : fatigue  · Eyes  o Denies  o : blurred vision, changes in vision  · HENT  o Denies  o : headaches  · Cardiovascular  o Denies  o : chest pain, irregular heart beats, rapid heart rate, dyspnea on exertion  · Respiratory  o Denies  o : shortness of breath, wheezing, cough  · Gastrointestinal  o Denies  o : nausea, vomiting, diarrhea, constipation, abdominal pain, blood in stools, melena  · Genitourinary  o Denies  o : frequency, dysuria,  hematuria  · Integument  o Denies  o : rash, new skin lesions  · Musculoskeletal  o Admits  o : wrist pain, foot pain  o Denies  o : joint pain, joint swelling, muscle pain  · Endocrine  o Denies  o : polyuria, polydipsia      Vitals  Date Time BP Position Site L\R Cuff Size HR RR TEMP (F) WT  HT  BMI kg/m2 BSA m2 O2 Sat HC       05/12/2020 10:11 /72 Sitting    86 - R  98.4     97 %    08/14/2020 09:24 /70 Sitting    72 - R   253lbs 6oz 6'   34.36 2.42 95 %    09/21/2020 09:38 /75 Sitting    68 - R  97.9 247lbs 4oz 6'   33.53 2.39 98 %          Physical Examination  · Constitutional  o Appearance  o : well developed, well-nourished, no acute distress  · Head and Face  o Head  o : normocephalic, atraumatic  · Ears, Nose, Mouth and Throat  o Ears  o :   § External Ears  § : external auditory canal appearance normal, no discharge present  § Otoscopic Examination  § : tympanic membranes pearly white/gray bilaterally  o Nose  o :   § External Nose  § : no lesions noted  § Nasopharynx  § : no discharge present  o Oral Cavity  o :   § Oral Mucosa  § : oral mucosa light pink  o Throat  o :   § Oropharynx  § : tonsils without exudate, no palatal petechiae  · Neck  o Inspection/Palpation  o : normal appearance, no masses or tenderness, trachea midline  o Thyroid  o : gland size normal, nontender, no nodules or masses present on palpation  · Respiratory  o Respiratory Effort  o : breathing unlabored  o Inspection of Chest  o : chest rise symmetric bilaterally  o Auscultation of Lungs  o : clear to auscultation bilaterally throughout inspiration and expiration  · Cardiovascular  o Heart  o :   § Auscultation of Heart  § : regular rate and rhythm, no murmurs, gallops or rubs  o Peripheral Vascular System  o :   § Extremities  § : no edema  · Lymphatic  o Neck  o : no cervical lymphadenopathy, no supraclavicular lymphadenopathy  · Psychiatric  o Mood and Affect  o : mood normal, affect appropriate  · Left  Wrist  o Inspection  o : no deformity, no scarring, no redness, no swelling  o Palpation  o : radial styloid non tender, scaphoid non tender, CMC joint non tender, Listeis tubercle non tender, ulnar styloid non tender, pisiform non tender, hook of hammate non tender, non-tender to palpation  o Range of Motion  o : flexion normal 70-90 degrees, extension normal 75-90 degrees, pronation normal 80 degrees, supination normal 80 degrees, radial deviation normal 10-30 degrees, ulnar deviation normal 10-30 degrees  o Neurovascular  o : radial pulse present, ulnar pulse present, neurovascularly intact  · Right Ankle/Foot  o Inspection  o : swelling present   o Palpation  o : TTP along lateral aspect of foot and along 5th digit  o Neurovascular  o : neurovascularly intact to L1-S1, normal sensation, all 5 nerves to foot  o Range of Motion  o : decreased ROM secondary to pain with plantar and dorsiflexion          Assessment  · Foot pain, right     729.5/M79.671  · Wrist pain, acute, left     719.43/M25.532    Problems Reconciled  Plan  · Orders  o ACO-39: Current medications updated and reviewed () - - 09/21/2020  o ACO-14: Influenza immunization was not administered for reasons documented () - - 09/21/2020  o Xray foot right [facility] Preferred View ([id number]) - 729.5/M79.671 - 09/21/2020  o Xray wrist 3v left [facility]erred View ([id number]) - 719.43/M25.532 - 09/21/2020  · Medications  o Medications have been Reconciled  o Transition of Care or Provider Policy  · Instructions  o Patient is taking medications as prescribed and doing well.   o Patient was educated/instructed on their diagnosis, treatment and medications prior to discharge from the clinic today.  o Patient instructed to seek medical attention urgently for new or worsening symptoms.  o Call the office with any concerns or questions.  o Electronically Identified Patient Education Materials Provided Electronically  · Disposition  o Call or Return if symptoms  worsen or persist.  o Follow Up PRN            Electronically Signed by: MIKE Cote -Author on September 21, 2020 11:14:06 AM

## 2021-05-13 NOTE — PROGRESS NOTES
Progress Note      Patient Name: Collins Brumfield   Patient ID: [id number]   Sex: Male   YOB: 1950    Primary Care Provider: Lenard Coleman PA-C   Referring Provider: Lenard Coleman PA-C    Visit Date: May 4, 2020    Provider: Lenard Coleman PA-C   Location: [facility]   Location Address: [address] [place] [address], [address]  [place], [place]  [id number]   Location Phone: [phone]          Chief Complaint  · Follow up      History Of Present Illness  Video Conferencing Visit  Collins Brumfield is a 70 year old /White male who is presenting for evaluation via video conferencing. Verbal consent obtained before beginning visit.   The following staff were present during this visit: BC Lee   Collins Brumfield is a 70 year old /White male who presents for evaluation and treatment of:      a follow up to Asthma    Pt states that he is feeling a little better since yesterday.    Pt states when he coughs, he has an intense pain in the temples.      No fever    States cough is slightly productive this morning    No sinus drainage noted.      Prescribed Medrol DosePak, Advair HFA, and Singulair  Pt states that it is clearing up some    He is coughing a lot       Past Medical History  Disease Name Date Onset Notes   ***No Significant Medical History --  --    Allergic rhinitis due to allergen 04/30/2020 --    Asthma 04/30/2020 --    Insomnia, unspecified 07/26/2019 --    Lumbago/low back pain 06/02/2016 --    Lumbar Spinal Stenosis --  --    Sleep apnea 09/30/2019 --    Synovial cyst, unspecified 06/02/2016 right L2/3          Past Surgical History  Procedure Name Date Notes   Back surgery --  --    Back Surgery, Lumbar 8 years? --    Colonoscopy 2018 normal   Hand surgery --  --    Lumbar laminectomy 5/16/2016 L2-3   Tonsilectomy --  --          Medication List  Name Date Started Instructions   Advair -21 mcg/actuation inhalation HFA aerosol inhaler 04/30/2020 inhale 2 puffs by inhalation  route 2 times per day in the morning and evening   albuterol sulfate 90 mcg/actuation inhalation HFA aerosol inhaler 04/30/2020 inhale 1 - 2 puffs (90 - 180 mcg) by inhalation route every 6 hours as needed   doxycycline hyclate 100 mg oral capsule 05/04/2020 take 1 capsule by oral route 2 times a day for 10 days   Medrol (Pramod) 4 mg oral tablets,dose pack 04/30/2020 take by oral route as directed per package instructions   Singulair 10 mg oral tablet 04/30/2020 take 1 tablet (10 mg) by oral route once daily in the evening   Viagra 100 mg oral tablet 04/09/2020 take 1 tablet (100 mg) by oral route once daily as needed approximately 1 hour before sexual activity         Allergy List  Allergen Name Date Reaction Notes   NO KNOWN DRUG ALLERGIES --  --  --        Allergies Reconciled  Family Medical History  Disease Name Relative/Age Notes   Family history of cancer Brother/  Father/   Father; Brother         Social History  Finding Status Start/Stop Quantity Notes   Alcohol Current some day --/-- --  12/06/2019 - 10/28/2019 - rarely drinks, less than 1 drink per day   lives with spouse --  --/-- --  --     --  --/-- --  --    No known infection risk --  --/-- --  --    Retired --  --/-- --  --    Tobacco Never --/-- --  12/06/2019 - 10/28/2019 - never smoker         Immunizations  NameDate Admin Mfg Trade Name Lot Number Route Inj VIS Given VIS Publication   Hepatitis A04/22/2019 SKB HAVRIX-ADULT [id number] IM RA 04/22/2019    Comments:    Hepatitis A07/24/2018 SKB HAVRIX-ADULT  IM  07/24/2018 07/20/2016   Comments: ndc [id number] tolerated well   InfluenzaRefused 04/22/2019 NE Not Entered  NE NE     Comments: pt refused   Iceohvwlh73Vasqlal 04/22/2019 NE Not Entered  NE NE     Comments: pt refused   Prevnar 13Refused 04/22/2019 NE Not Entered  NE NE     Comments: pt refused   ShinglesRefused 04/22/2019 NE Not Entered  NE NE     Comments: pt refused   TdapUnknown NE Not Entered  IM NE 04/22/2019    Comments: pt  unknown last inj         Review of Systems  · Constitutional  o Denies  o : fever, fatigue, weight loss, weight gain  · Cardiovascular  o Denies  o : lower extremity edema, claudication, chest pressure, palpitations  · Respiratory  o Admits  o : cough  o Denies  o : shortness of breath, wheezing, hemoptysis, dyspnea on exertion  · Gastrointestinal  o Denies  o : nausea, vomiting, diarrhea, constipation, abdominal pain      Physical Examination  · Constitutional  o Appearance  o : well-nourished, no acute distress  · Respiratory  o Respiratory Effort  o : breathing comfortably  · Skin and Subcutaneous Tissue  o General Inspection  o : no visible rash, no lesions  · Neurologic  o Mental Status Examination  o :   § Orientation  § : grossly oriented to person, place and time, no facial droop          Assessment  · Allergic rhinitis due to allergen     477.9/J30.9  · Asthma     493.90/J45.909  · Bronchitis, acute     466.0/J20.9  · Cough     786.2/R05      Plan  · Orders  o ACO-39: Current medications updated and reviewed () - - 05/04/2020  · Medications  o doxycycline monohydrate 100 mg oral capsule   SIG: take 1 capsule by oral route 2 times a day for 10 days   DISP: (20) capsules with 0 refills  Prescribed on 05/04/2020     o Symbicort 160-4.5 mcg/actuation inhalation HFA aerosol inhaler   SIG: inhale 2 puffs by inhalation route 2 times per day in the morning and evening   DISP: (3) 6 gm aer w/adap with 1 refills  Discontinued on 05/04/2020     o Medications have been Reconciled  o Transition of Care or Provider Policy  · Instructions  o Take all medications as prescribed/directed.  o Patient instructed/educated on their diet and exercise program.  o Rest. Increase Fluids.  o Patient was educated/instructed on their diagnosis, treatment and medications prior to discharge from the clinic today.  o Patient counseled to reduce calorie intake.  o Discussed Covid-19 precautions including, but not limited to, social  distancing, avoid touching your face, and hand washing.   · Disposition  o Call or Return if symptoms worsen or persist.  o F/U 1 week  o Care Transition            Electronically Signed by: Lenard Coleman PA-C -Author on May 4, 2020 01:40:33 PM

## 2021-05-13 NOTE — PROGRESS NOTES
Progress Note      Patient Name: Collins Brumfield   Patient ID: [id number]   Sex: Male   YOB: 1950    Primary Care Provider: Lenard Coleman PA-C   Referring Provider: Lneard Coleman PA-C    Visit Date: October 7, 2020    Provider: Melvin Fletcher DPM   Location: [facility]   Location Address: [address] [place] [address]  [id number]   Location Phone: [phone]          Chief Complaint  · Follow Up Office Visit S/P Surgery      History Of Present Illness  Collins Brumfield is a 70 year old /White male who presents today for a postoperative visit.      Procedure: ORIF of right fifth metatarsal shaft  Date: 2 October 2020    Patient states they are doing well without complications.  Patient states they are following post-op instructions.  Patient states pain is controlled.      Patient denies any fevers, chills, nausea, vomiting, shortness of breathe, nor any other constitutional signs nor symptoms.                 Past Medical History  ***No Significant Medical History; Allergic rhinitis due to allergen; Asthma; Erectile dysfunction; Foot fracture, right; Heel pain; Ingrown toenail; Insomnia, unspecified; Lumbago/low back pain; Lumbar Spinal Stenosis; Sleep apnea; Surgery follow-up; Synovial cyst, unspecified         Past Surgical History  Back surgery; Back Surgery, Lumbar; Colonoscopy; Hand surgery; Lumbar laminectomy; Tonsilectomy         Medication List  tadalafil 20 mg oral tablet         Allergy List  NO KNOWN DRUG ALLERGIES         Family Medical History  Family history of cancer         Social History  Alcohol (Current some day); lives with spouse; ; No known infection risk; Retired; Tobacco (Never)         Immunizations  Name Date Admin   Hepatitis A 04/22/2019   Hepatitis A 07/24/2018   Influenza Refused   [id number] Refused   Prevnar 13 Refused   Shingles Refused   Tdap 04/22/2019         Review of Systems  · Constitutional  o Denies  o : fatigue, night  sweats  · Eyes  o Denies  o : double vision, blurred vision  · HENT  o Denies  o : vertigo, recent head injury  · Cardiovascular  o Denies  o : chest pain, irregular heart beats  · Respiratory  o Denies  o : shortness of breath, productive cough  · Gastrointestinal  o Denies  o : nausea, vomiting  · Genitourinary  o Denies  o : dysuria, urinary retention  · Integument  o * See HPI  · Neurologic  o Denies  o : altered mental status, seizures  · Musculoskeletal  o * See HPI  · Endocrine  o Denies  o : cold intolerance, heat intolerance  · Heme-Lymph  o Denies  o : petechiae, lymph node enlargement or tenderness  · Allergic-Immunologic  o Denies  o : frequent illnesses      Vitals  Date Time BP Position Site L\R Cuff Size HR RR TEMP (F) WT  HT  BMI kg/m2 BSA m2 O2 Sat FR L/min FiO2 HC       10/07/2020 08:04 /80 Sitting    95 - R  97.6  6'     96 %            Physical Examination  · Constitutional  o Appearance  o : well developed, well-nourished, no obvious deformities present  · Cardiovascular  o Peripheral Vascular System  o :   § Pedal Pulses  § : pulses 2 + and symmetrical  § Extremities  § : no edema in lower extremities  · Skin and Subcutaneous Tissue  o General Inspection  o : Skin is noted to have normal texture and turgor, with no excrescences noted.   o Digits and Nails  o : The toenails are noted to be without disese.  · Neurologic  o Sensation  o : Epicritic sensations intact bilaterally.  · Right Ankle/Foot  o Inspection  o : Posterior splint, below-knee cast and dressing are dry and intact without signs of breakthrough. Sutures intact with skin edges well-coapted with no signs of dehiscence. Healthy surgical skin edges. No drainage present. No edema, erythema, calor, lymphangitis, nor signs of infection seen.     IMAGING:  3 view, AP, MO, Lateral, right foot.  ORIF in the fifth metatarsal shaft in good anatomical alignment.  No change compared to intraoperative views.            Assessment  · Postoperative Exam Following Surgery     V67.00      Plan  · Orders  o Casting Supplies (Short Leg) Adult () - - 10/07/2020  o Xray foot right [facility] Preferred View ([id number]) - - 10/07/2020  o Application of short leg cast (21620) - - 10/07/2020  · Medications  o Medications have been Reconciled  o Transition of Care or Provider Policy  · Instructions  o Follow up in 2 weeks. X-rays. Application of second cast.  o Continue nonweightbearing to the right foot. Continue to keep cast dry and intact. The patient states understanding and agreement with this plan.   o Patient to monitor for recurrence of symptoms and to contact Dr. Morris office for a follow-up appointment.  o Dr. Fletcher advised patient may resume driving, but advised not to drive while wearing anambulatory device. Advised quick/hard depression of brakes could cause injury to areas. Also advised of possible legal implications while driving in restrictive device. The patient states understanding and agreement with these instructions.  o Electronically Identified Patient Education Materials Provided Electronically            Electronically Signed by: Melvin Fletcher DPM -Author on October 7, 2020 08:29:13 AM

## 2021-05-13 NOTE — PROGRESS NOTES
Progress Note      Patient Name: Collins Brumfield   Patient ID: [id number]   Sex: Male   YOB: 1950    Primary Care Provider: Lenard Coleman PA-C   Referring Provider: Lenard Coleman PA-C    Visit Date: October 21, 2020    Provider: Melvin Fletcher DPM   [facility]   Location Address: [address] [place] [address]  [id number]   Location Phone: [phone]          Chief Complaint  · Follow Up Office Visit S/P Surgery      History Of Present Illness  Collins Brumfield is a 70 year old /White male who presents today for a postoperative visit.      Procedure: ORIF of right fifth metatarsal shaft  Date: 2 October 2020    Patient states they are doing well without complications.  Patient states they are following post-op instructions.  Patient states pain is controlled.      Patient denies any fevers, chills, nausea, vomiting, shortness of breathe, nor any other constitutional signs nor symptoms.      Pt states he fell and landed on his cast since his last visit.           Past Medical History  ***No Significant Medical History; Allergic rhinitis due to allergen; Asthma; Erectile dysfunction; Foot fracture, right; Heel pain; Ingrown toenail; Insomnia, unspecified; Lumbago/low back pain; Lumbar Spinal Stenosis; Sleep apnea; Surgery follow-up; Synovial cyst, unspecified         Past Surgical History  Back surgery; Back Surgery, Lumbar; Colonoscopy; Hand surgery; Lumbar laminectomy; Tonsilectomy         Medication List  tadalafil 20 mg oral tablet         Allergy List  NO KNOWN DRUG ALLERGIES       Allergies Reconciled  Family Medical History  Family history of cancer         Social History  Alcohol (Current some day); lives with spouse; ; No known infection risk; Retired; Tobacco (Never)         Immunizations  Name Date Admin   Hepatitis A 04/22/2019   Hepatitis A 07/24/2018   Influenza Refused   Ycasckpiw62 Refused   Prevnar 13 Refused   Shingles Refused   Tdap  04/22/2019         Review of Systems  · Constitutional  o Denies  o : fatigue, night sweats  · Eyes  o Denies  o : double vision, blurred vision  · HENT  o Denies  o : vertigo, recent head injury  · Cardiovascular  o Denies  o : chest pain, irregular heart beats  · Respiratory  o Denies  o : shortness of breath, productive cough  · Gastrointestinal  o Denies  o : nausea, vomiting  · Genitourinary  o Denies  o : dysuria, urinary retention  · Integument  o * See HPI  · Neurologic  o Denies  o : altered mental status, seizures  · Musculoskeletal  o * See HPI  · Endocrine  o Denies  o : cold intolerance, heat intolerance  · Heme-Lymph  o Denies  o : petechiae, lymph node enlargement or tenderness  · Allergic-Immunologic  o Denies  o : frequent illnesses      Vitals  Date Time BP Position Site L\R Cuff Size HR RR TEMP (F) WT  HT  BMI kg/m2 BSA m2 O2 Sat FR L/min FiO2 HC       10/21/2020 02:20 /77 Sitting    84 - R  97.6  6'     96 %            Physical Examination  · Constitutional  o Appearance  o : well developed, well-nourished, no obvious deformities present  · Cardiovascular  o Peripheral Vascular System  o :   § Pedal Pulses  § : pulses 2 + and symmetrical  § Extremities  § : no edema in lower extremities  · Skin and Subcutaneous Tissue  o General Inspection  o : Skin is noted to have normal texture and turgor, with no excrescences noted.   o Digits and Nails  o : The toenails are noted to be without disese.  · Neurologic  o Sensation  o : Epicritic sensations intact bilaterally.  · Right Ankle/Foot  o Inspection  o : Fiberglass below-knee cast and dressing are dry and intact without signs of breakthrough. Sutures intact with skin edges well-coapted with no signs of dehiscence. Healthy surgical skin edges. No drainage present. No edema, erythema, calor, lymphangitis, nor signs of infection seen.     Dr. Fletcher reviewed radiographs and results from [facility] and discussed them with the  patient.  These are significant for ORIF in the fifth metatarsal shaft in good anatomical alignment.  No change compared to intraoperative views.  Early trabeculation seen across fracture fragments.      Cast shows the heel to completed worn through.           Assessment  · Postoperative Exam Following Surgery     V67.00      Plan  · Orders  o Casting Supplies (Short Leg) Adult () - - 10/21/2020  o Xray foot right [facility] Preferred View ([id number]) - - 10/21/2020  o Application of short leg cast (53284) - - 10/21/2020  · Medications  o Medications have been Reconciled  o Transition of Care or Provider Policy  · Instructions  o Follow up in 2 weeks. Suture removal. X-rays. Application of third cast.  o Continue nonweightbearing to the right foot. Continue to keep cast dry and intact. The patient states understanding and agreement with this plan.   o Patient to monitor for recurrence of symptoms and to contact Dr. Morris office for a follow-up appointment.  o Dr. Fletcher advised patient may resume driving, but advised not to drive while wearing anambulatory device. Advised quick/hard depression of brakes could cause injury to areas. Also advised of possible legal implications while driving in restrictive device. The patient states understanding and agreement with these instructions.  o Electronically Identified Patient Education Materials Provided Electronically            Electronically Signed by: Melvin Fletcher DPM -Author on October 21, 2020 02:35:28 PM

## 2021-05-13 NOTE — PROGRESS NOTES
Progress Note      Patient Name: Collins Brumfield   Patient ID: [id number]   Sex: Male   YOB: 1950    Primary Care Provider: Lenard Coleman PA-C   Referring Provider: Lenard Coleman PA-C    Visit Date: August 14, 2020    Provider: Lenard Coleman PA-C   Location: [facility]   Location Address: [address] [place] [address], [address]  [place], [place]  [id number]   Location Phone: [phone]          Chief Complaint  · Annual Wellness Exam      History Of Present Illness  The patient is a 70 year old /White male who has come to this office for his Annual Wellness Visit.   His Primary Care Provider is Lenard Coleman PA-C. His comprehensive Care Team list, including suppliers, has been updated on the Facesheet. His medical/family history, height, weight, BMI, and blood pressure have been reviewed and are in the chart. The Health Risk Assessment has been completed and scanned in the chart.   Medications are listed in the medication list.   The active problem list includes: Allergic rhinitis due to allergen, Asthma, Insomnia, unspecified, Lumbago/low back pain, Lumbar Spinal Stenosis, Sleep Apnea, and Synovial cyst, unspecified   The patient does not have a history of substance use.   Patient reports his diet is adequate.   The Mini-Cog has been administered and is scanned in chart. The results are negative. His cognitive function is without limitation.   A hearing loss screen was completed today and the result is negative.   Patient does not have any risk factors for depression. Patient completed the PHQ-9 today and it has been scanned in the chart. The total score is 1-4.   The Timed Up and Go screen was administered today and the result is negative.   The Moyer Index of [place] in ADLs indicated full function (score of 6).   A Falls Risk Assessment has been completed, including a review of home fall hazards and medication review.   Overall, the patient's functional ability is noted by this provider to be  within normal limits. His level of safety is noted to be within normal limits. His balance/gait is within normal limits. There have been no falls in the past year. Patient-specific home safety recommendations have been reviewed and a copy has been given to patient.   He denies issues with leaking urine.   There are no additional risk factors identified.   Living Will/Advanced Directive has not previously been completed.   Personalized health advice was given to the patient and a written health screening schedule was established; see Plan for details.   Collins Brumfield is a 70 year old /White male who presents for evaluation and treatment of:       Past Medical History  Disease Name Date Onset Notes   ***No Significant Medical History --  --    Allergic rhinitis due to allergen 04/30/2020 --    Asthma 04/30/2020 --    Insomnia, unspecified 07/26/2019 --    Lumbago/low back pain 06/02/2016 --    Lumbar Spinal Stenosis --  --    Sleep Apnea 09/30/2019 --    Synovial cyst, unspecified 06/02/2016 right L2/3          Past Surgical History  Procedure Name Date Notes   Back surgery --  --    Back Surgery, Lumbar 8 years? --    Colonoscopy 2018 normal   Hand surgery --  --    Lumbar laminectomy 5/16/2016 L2-3   Tonsilectomy --  --          Medication List  Name Date Started Instructions   tadalafil 20 mg oral tablet 08/14/2020 take 1 tablet (20 mg) by oral route as needed approximately 1 hour before sexual activity         Allergy List  Allergen Name Date Reaction Notes   NO KNOWN DRUG ALLERGIES --  --  --          Family Medical History  Disease Name Relative/Age Notes   Family history of cancer Brother/  Father/   Father; Brother         Social History  Finding Status Start/Stop Quantity Notes   Alcohol Current some day --/-- --  12/06/2019 - 10/28/2019 - rarely drinks, less than 1 drink per day   lives with spouse --  --/-- --  --     --  --/-- --  --    No known infection risk --  --/-- --  --    Retired  --  --/-- --  --    Tobacco Never --/-- --  12/06/2019 - 10/28/2019 - never smoker         Immunizations  NameDate Admin Mfg Trade Name Lot Number Route Inj VIS Given VIS Publication   Hepatitis A04/22/2019 SKB HAVRIX-ADULT [id number] IM RA 04/22/2019    Comments:    Hepatitis A07/24/2018 SKB HAVRIX-ADULT  IM  07/24/2018 07/20/2016   Comments: ndc [id number] tolerated well   InfluenzaRefused 04/22/2019 NE Not Entered  NE NE     Comments: pt refused   Gwhveessc11Imczqww 04/22/2019 NE Not Entered  NE NE     Comments: pt refused   Prevnar 13Refused 04/22/2019 NE Not Entered  NE NE     Comments: pt refused   ShinglesRefused 04/22/2019 NE Not Entered  NE NE     Comments: pt refused   TdapUnknown NE Not Entered  IM NE 04/22/2019    Comments: pt unknown last inj         Vitals  Date Time BP Position Site L\R Cuff Size HR RR TEMP (F) WT  HT  BMI kg/m2 BSA m2 O2 Sat        08/14/2020 09:24 /70 Sitting    72 - R   253lbs 6oz 6'   34.36 2.42 95 %          Physical Examination  · Head and Face  o Head  o : normocephalic, atraumatic  · Ears, Nose, Mouth and Throat  o Ears  o :   § External Ears  § : external auditory canal appearance normal, no discharge present  § Otoscopic Examination  § : tympanic membranes pearly white/gray bilaterally  o Nose  o :   § External Nose  § : no lesions noted  § Nasopharynx  § : no discharge present  o Oral Cavity  o :   § Oral Mucosa  § : oral mucosa light pink  o Throat  o :   § Oropharynx  § : tonsils without exudate, no palatal petechiae  · Neck  o Inspection/Palpation  o : normal appearance, no masses or tenderness, trachea midline  o Thyroid  o : gland size normal, nontender, no nodules or masses present on palpation  · Respiratory  o Respiratory Effort  o : breathing unlabored  o Inspection of Chest  o : chest rise symmetric bilaterally  o Auscultation of Lungs  o : clear to auscultation bilaterally throughout inspiration and expiration  · Cardiovascular  o Heart  o :    § Auscultation of Heart  § : regular rate and rhythm, no murmurs, gallops or rubs  o Peripheral Vascular System  o :   § Extremities  § : no edema  · Lymphatic  o Neck  o : no cervical lymphadenopathy, no supraclavicular lymphadenopathy  · Psychiatric  o Mood and Affect  o : mood normal, affect appropriate          Assessment  · Encounter for Medicare annual wellness exam     V70.0/Z00.00  · Screening for depression     V79.0/Z13.89  · Screening for alcoholism     V79.1/Z13.39      Plan  · Orders  o Falls Risk Assessment Completed (3288F) - V70.0/Z00.00 - 08/14/2020  o Brief hearing screening (written) [facility] () - V70.0/Z00.00 - 08/14/2020  o Presence or absence of urinary incontinence assessed (LIDA) (1090F) - V70.0/Z00.00 - 08/14/2020  o ACO-39: Current medications updated and reviewed () - - 08/14/2020  o ACO-19: Colorectal cancer screening results documented and reviewed (3017F) - - 08/14/2020  o ACO-14: Influenza immunization was not administered for reasons documented () - - 08/14/2020  · Medications  o Medications have been Reconciled  o Transition of Care or Provider Policy  · Instructions  o Health Risk Assessment has been reviewed with the patient.  o Written health screening schedule for next 5-10 years was established with patient; information scanned in chart and given/mailed to patient.  o Fall prevention methods discussed and a copy of recommendations given/mailed to patient.  o Depression Screen completed and scanned into the EMR under the designated folder within the patient's documents.  o Patient was educated/instructed on their diagnosis, treatment and medications prior to discharge from the clinic today.            Electronically Signed by: Lenard Coleman PA-C -Author on August 14, 2020 02:10:00 PM

## 2021-05-13 NOTE — PROGRESS NOTES
Progress Note      Patient Name: Collins Brumfield   Patient ID: [id number]   Sex: Male   YOB: 1950    Primary Care Provider: Lenard Coleman PA-C   Referring Provider: Lenard Coleman PA-C    Visit Date: November 18, 2020    Provider: Melvin Fletcher DPM   Location: [facility]   Location Address: [address] [place] [address]  [id number]   Location Phone: [phone]          Chief Complaint  · Follow Up Office Visit S/P Surgery      History Of Present Illness  Collins Brumfield is a 70 year old /White male who presents today for a postoperative visit.      Procedure: ORIF of right fifth metatarsal shaft  Date: 2 October 2020    Patient states they are doing well without complications.  Patient states they are following post-op instructions.  Patient states pain is controlled.      Patient denies any fevers, chills, nausea, vomiting, shortness of breathe, nor any other constitutional signs nor symptoms.      Patient relates no medical changes since their last visit.    Pt states being pleased with surgical results, thus far.           Past Medical History  ***No Significant Medical History; Aftercare following surgery; Allergic rhinitis due to allergen; Asthma; Erectile dysfunction; Foot fracture, right; Heel pain; Ingrown toenail; Insomnia, unspecified; Lumbago/low back pain; Lumbar Spinal Stenosis; Sleep apnea; Surgery follow-up; Synovial cyst, unspecified         Past Surgical History  Back surgery; Back Surgery, Lumbar; Colonoscopy; Hand surgery; Lumbar laminectomy; Tonsilectomy         Medication List  tadalafil 20 mg oral tablet         Allergy List  NO KNOWN DRUG ALLERGIES       Allergies Reconciled  Family Medical History  Family history of cancer         Social History  Alcohol (Current some day); lives with spouse; ; No known infection risk; Retired; Tobacco (Never)         Immunizations  Name Date Admin   Hepatitis A 04/22/2019   Hepatitis A 07/24/2018    Influenza Refused   [id number] Refused   Prevnar 13 Refused   Shingles Refused   Tdap 04/22/2019         Review of Systems  · Constitutional  o Denies  o : fatigue, night sweats  · Eyes  o Denies  o : double vision, blurred vision  · HENT  o Denies  o : vertigo, recent head injury  · Cardiovascular  o Denies  o : chest pain, irregular heart beats  · Respiratory  o Denies  o : shortness of breath, productive cough  · Gastrointestinal  o Denies  o : nausea, vomiting  · Genitourinary  o Denies  o : dysuria, urinary retention  · Integument  o * See HPI  · Neurologic  o Denies  o : altered mental status, seizures  · Musculoskeletal  o * See HPI  · Endocrine  o Denies  o : cold intolerance, heat intolerance  · Heme-Lymph  o Denies  o : petechiae, lymph node enlargement or tenderness  · Allergic-Immunologic  o Denies  o : frequent illnesses      Physical Examination  · Constitutional  o Appearance  o : well developed, well-nourished, no obvious deformities present  · Cardiovascular  o Peripheral Vascular System  o :   § Pedal Pulses  § : pulses 2 + and symmetrical  § Extremities  § : no edema in lower extremities  · Skin and Subcutaneous Tissue  o General Inspection  o : Skin is noted to have normal texture and turgor, with no excrescences noted.   o Digits and Nails  o : The toenails are noted to be without disese.  · Neurologic  o Sensation  o : Epicritic sensations intact bilaterally.  · Right Ankle/Foot  o Inspection  o : Fiberglass below-knee cast and dressing are dry and intact without signs of breakthrough. Upon removal of sutures, skin edges remain well-coapted with no signs of dehiscence.     Dr. Fletcher reviewed radiographs and results from [facility] and discussed them with the patient.  These are significant for ORIF in the fifth metatarsal shaft in good anatomical alignment.  No change compared to intraoperative views.  Improvement and increase in trabeculation seen across fracture fragments.            Assessment  · Postoperative Exam Following Surgery     V67.00      Plan  · Orders  o Xray foot right [facility] Preferred View ([id number]) - - 11/18/2020  · Medications  o Medications have been Reconciled  o Transition of Care or Provider Policy  · Instructions  o Follow up in 3 weeks. X-rays. Probable discharge. No impact x one month.  o Patient to monitor for recurrence of symptoms and to contact Dr. Morris office for a follow-up appointment.  o Patient may begin to weight bear as tolerated in supportive shoes. No impact activities. After that time, the patient may increase activities as tolerated. Patient states understanding and agreement with this plan.  o Electronically Identified Patient Education Materials Provided Electronically  · Disposition  o Call or Return if symptoms worsen or persist.            Electronically Signed by: Melvin Fletcher DPM -Author on November 18, 2020 02:57:32 PM

## 2021-05-13 NOTE — PROGRESS NOTES
Progress Note      Patient Name: Collins Brumfield   Patient ID: [id number]   Sex: Male   YOB: 1950    Primary Care Provider: Lenard Coleman PA-C   Referring Provider: Lenard Coleman PA-C    Visit Date: November 12, 2020    Provider: Derek Osuna MD   Location: [facility]   Location Address: [address] [place] [address]  [id number]   Location Phone: [phone]          Chief Complaint     F/u worsening symptoms/ frequent falls.       History Of Present Illness  Collins Brumfield is a 70 year old /White male who presents today to [facility] today referred from Lenard Coleman PA-C.      70-year-old man here for another complaint.  He states that 8 weeks ago he fell down the steps.  They just moved in their house that same day when he fell.  He states that he misjudges that because there is an optical illusion that the door is closer than it is.  He lost his balance and fell.  It happened again the same step the next day.  He broke his foot and had a cast on.  He has been using a scooter.  When he stands up he fell down he lost his balance.  He cannot stand up without losing his balance.  He cannot stand on his right foot.  He has no history of falls in the past.  There is no history of shuffling gait, weakness of his leg, foot drop.  His daughter who is a nurse wanted him to be evaluated for his being off balance and falling.       Past Medical History  ***No Significant Medical History; Aftercare following surgery; Allergic rhinitis due to allergen; Asthma; Erectile dysfunction; Foot fracture, right; Heel pain; Ingrown toenail; Insomnia, unspecified; Lumbago/low back pain; Lumbar Spinal Stenosis; Sleep apnea; Surgery follow-up; Synovial cyst, unspecified         Past Surgical History  Back surgery; Back Surgery, Lumbar; Colonoscopy; Hand surgery; Lumbar laminectomy; Tonsilectomy         Medication List  tadalafil 20 mg oral tablet         Allergy  List  NO KNOWN DRUG ALLERGIES         Family Medical History  Family history of cancer         Social History  Alcohol (Current some day); lives with spouse; ; No known infection risk; Retired; Tobacco (Never)         Immunizations  Name Date Admin   Hepatitis A 04/22/2019   Hepatitis A 07/24/2018   Influenza Refused   [id number] Refused   Prevnar 13 Refused   Shingles Refused   Tdap 04/22/2019         Review of Systems  · Constitutional  o Denies  o : chills, excessive sweating, fatigue, fever, sycope/passing out, weight gain, weight loss  · Eyes  o Denies  o : changes in vision, blurry vision, double vision  · HENT  o Denies  o : loss of hearing, ringing in the ears, ear aches, sore throat, nasal congestion, sinus pain, nose bleeds, seasonal allergies  · Cardiovascular  o Denies  o : blood clots, swollen legs, anemia, easy burising or bleeding, transfusions  · Respiratory  o Denies  o : shortness of breath, dry cough, productive cough, pneumonia, COPD  · Gastrointestinal  o Denies  o : difficulty swallowing, reflux  · Genitourinary  o Denies  o : incontinence  · Neurologic  o Admits  o : loss of balance, falls  o Denies  o : headache, seizure, stroke, tremor, dizziness/vertigo, difficulty with sleep, numbness/tingling/paresthesia , difficulty with coordination, difficulty with dexterity, weakness  · Musculoskeletal  o Denies  o : neck stiffness/pain, swollen lymph nodes, muscle aches, joint pain, weakness, spasms, sciatica, pain radiating in arm, pain radiating in leg, low back pain  · Endocrine  o Denies  o : diabetes, thyroid disorder  · Psychiatric  o Denies  o : anxiety, depression      Vitals  Date Time BP Position Site L\R Cuff Size HR RR TEMP (F) WT  HT  BMI kg/m2 BSA m2 O2 Sat FR L/min FiO2 HC       11/12/2020 08:19 AM        94.9           11/12/2020 08:32 /76 Sitting    80 - R    6'                 Physical Examination     There is no weakness of the lower extremities individual muscle  proximally and distally.  There is no weakness of the upper extremities on visual muscle testing.  Toe movements are symmetrical even though he has a callus on the right foot.  Sensation is decreased to pinprick questionably below the knee but is not consistent.  Vibration is intact.  There is no sensory loss in the upper extremities.  Station gait he cannot walk.  He cannot stand up on the left foot without losing his balance.           Assessment  · Fall (on) (from) other stairs and steps, initial encounter     E880.9/W10.8XXA  I discussed with him that the fall occurred because he just moved into a new house and there was an accident that he was not used to the steps. He may have neuropathy that his balance is off but the main problem is that he cannot stand up on the left leg without losing his balance because the right foot is broken. I will work him up for neuropathy when he is able to walk. At this time I discussed with him that he did not fall because he had a stroke, Parkinson's disease, foot drop or any neurologic disease.    40 minutes was spent for this high complexity visit and more than half the time was spent face-to-face with the patient for examination, counseling, planning and recommendations.      Plan  · Medications  o Medications have been Reconciled  o Transition of Care or Provider Policy  · Instructions  o Encouraged to follow-up with Primary Care Provider for preventative care.            Electronically Signed by: Derek Osuna MD -Author on November 12, 2020 09:15:29 AM

## 2021-05-13 NOTE — PROGRESS NOTES
Progress Note      Patient Name: Collins Brumfield   Patient ID: [id number]   Sex: Male   YOB: 1950    Primary Care Provider: Lenard Coleman PA-C   Referring Provider: Lenard Coleman PA-C    Visit Date: December 9, 2020    Provider: Melvin Fletcher DPM   Location: [facility]   Location Address: [address] [place] [address]  [id number]   Location Phone: [phone]          Chief Complaint  · Follow Up Office Visit S/P Surgery      History Of Present Illness  Collins Brumfield is a 70 year old /White male who presents today for a postoperative visit.      Procedure: ORIF of right fifth metatarsal shaft  Date: 2 October 2020    Patient states they are doing well without complications.  Patient states they are following post-op instructions.  Patient states pain is controlled.      Patient denies any fevers, chills, nausea, vomiting, shortness of breathe, nor any other constitutional signs nor symptoms.      Patient relates no medical changes since their last visit.             Past Medical History  ***No Significant Medical History; Aftercare following surgery; Allergic rhinitis due to allergen; Asthma; Erectile dysfunction; Foot fracture, right; Heel pain; Ingrown toenail; Insomnia, unspecified; Lumbago/low back pain; Lumbar Spinal Stenosis; Sleep apnea; Surgery follow-up; Synovial cyst, unspecified         Past Surgical History  Back surgery; Back Surgery, Lumbar; Colonoscopy; Hand surgery; Lumbar laminectomy; Tonsilectomy         Medication List  tadalafil 20 mg oral tablet         Allergy List  NO KNOWN DRUG ALLERGIES       Allergies Reconciled  Family Medical History  Family history of cancer         Social History  Alcohol (Current some day); lives with spouse; ; No known infection risk; Retired; Tobacco (Never)         Immunizations  Name Date Admin   Hepatitis A 04/22/2019   Hepatitis A 07/24/2018   Influenza Refused   [id number] Refused   Prevnar 13 Refused    Shingles Refused   Tdap 04/22/2019         Review of Systems  · Constitutional  o Denies  o : fatigue, night sweats  · Eyes  o Denies  o : double vision, blurred vision  · HENT  o Denies  o : vertigo, recent head injury  · Cardiovascular  o Denies  o : chest pain, irregular heart beats  · Respiratory  o Denies  o : shortness of breath, productive cough  · Gastrointestinal  o Denies  o : nausea, vomiting  · Genitourinary  o Denies  o : dysuria, urinary retention  · Integument  o * See HPI  · Neurologic  o Denies  o : altered mental status, seizures  · Musculoskeletal  o * See HPI  · Endocrine  o Denies  o : cold intolerance, heat intolerance  · Heme-Lymph  o Denies  o : petechiae, lymph node enlargement or tenderness  · Allergic-Immunologic  o Denies  o : frequent illnesses      Physical Examination  · Constitutional  o Appearance  o : well developed, well-nourished, no obvious deformities present  · Cardiovascular  o Peripheral Vascular System  o :   § Pedal Pulses  § : pulses 2 + and symmetrical  § Extremities  § : no edema in lower extremities  · Skin and Subcutaneous Tissue  o General Inspection  o : Skin is noted to have normal texture and turgor, with no excrescences noted.   o Digits and Nails  o : The toenails are noted to be without disese.  · Neurologic  o Sensation  o : Epicritic sensations intact bilaterally.  · Right Ankle/Foot  o Inspection  o : Skin edges well-coapted with no signs of dehiscence. No hypertrophic scar formation. No pain with ROM.     Dr. Fletcher reviewed radiographs and results from [facility] and discussed them with the patient.  These are significant for ORIF in the fifth metatarsal shaft in good anatomical alignment.  No change compared to intraoperative views.  Continued improvement and increase in trabeculation seen across fracture fragments.           Assessment  · Postoperative Exam Following Surgery     V67.00      Plan  · Medications  o Medications have been  Reconciled  o Transition of Care or Provider Policy  · Instructions  o Patient to monitor for recurrence of symptoms and to contact Dr. Morris office for a follow-up appointment.  o Patient may begin to weight bear as tolerated in supportive shoes. No impact activities. After that time, the patient may increase activities as tolerated. Patient states understanding and agreement with this plan.  o The patient is discharged from this surgery.   o Patient may begin to weight bear as tolerated in supportive shoes. No impact activities for one month. After that time, the patient may increase activities as tolerated. Patient states understanding and agreement with this plan.  o Electronically Identified Patient Education Materials Provided Electronically  · Disposition  o Call or Return if symptoms worsen or persist.            Electronically Signed by: Melvin Fletcher DPM -Author on December 9, 2020 02:37:53 PM

## 2021-05-13 NOTE — PROGRESS NOTES
Progress Note      Patient Name: Collins Brumfield   Patient ID: [id number]   Sex: Male   YOB: 1950    Primary Care Provider: Lenard Coleman PA-C   Referring Provider: Lenard Coleman PA-C    Visit Date: May 12, 2020    Provider: Lenard Coleman PA-C   Location: [facility]   Location Address: [address] [place] [address], [address]  [place], [place]  [id number]   Location Phone: [phone]          Chief Complaint  · Follow up      History Of Present Illness  Collins Brumfield is a 70 year old /White male who presents for evaluation and treatment of:      a follow up    Pt was seen on 05/04/2020 c/o a cough (productive), fever, shortness of breath    He was prescribed a Medrol DosePak, Advair inhaler and Doxycycline 100mg BID.      Pt states that he is feeling much better today.  Slight cough with very slight production    Denies SOA, chest pain or tightness    No other issues or complaints at this time    Has one day left of his Doxycycline.    Pt is feeling good overall.   No CP, SOA, HA       Past Medical History  Disease Name Date Onset Notes   ***No Significant Medical History --  --    Allergic rhinitis due to allergen 04/30/2020 --    Asthma 04/30/2020 --    Insomnia, unspecified 07/26/2019 --    Lumbago/low back pain 06/02/2016 --    Lumbar Spinal Stenosis --  --    Sleep apnea 09/30/2019 --    Synovial cyst, unspecified 06/02/2016 right L2/3          Past Surgical History  Procedure Name Date Notes   Back surgery --  --    Back Surgery, Lumbar 8 years? --    Colonoscopy 2018 normal   Hand surgery --  --    Lumbar laminectomy 5/16/2016 L2-3   Tonsilectomy --  --          Medication List  Name Date Started Instructions   Advair -21 mcg/actuation inhalation HFA aerosol inhaler 04/30/2020 inhale 2 puffs by inhalation route 2 times per day in the morning and evening   albuterol sulfate 90 mcg/actuation inhalation HFA aerosol inhaler 04/30/2020 inhale 1 - 2 puffs (90 - 180 mcg) by inhalation route  every 6 hours as needed   doxycycline hyclate 100 mg oral capsule 05/04/2020 take 1 capsule by oral route 2 times a day for 10 days   Singulair 10 mg oral tablet 04/30/2020 take 1 tablet (10 mg) by oral route once daily in the evening   Viagra 100 mg oral tablet 04/09/2020 take 1 tablet (100 mg) by oral route once daily as needed approximately 1 hour before sexual activity         Allergy List  Allergen Name Date Reaction Notes   NO KNOWN DRUG ALLERGIES --  --  --          Family Medical History  Disease Name Relative/Age Notes   Family history of cancer Brother/  Father/   Father; Brother         Social History  Finding Status Start/Stop Quantity Notes   Alcohol Current some day --/-- --  12/06/2019 - 10/28/2019 - rarely drinks, less than 1 drink per day   lives with spouse --  --/-- --  --     --  --/-- --  --    No known infection risk --  --/-- --  --    Retired --  --/-- --  --    Tobacco Never --/-- --  12/06/2019 - 10/28/2019 - never smoker         Immunizations  NameDate Admin Mfg Trade Name Lot Number Route Inj VIS Given VIS Publication   Hepatitis A04/22/2019 SKB HAVRIX-ADULT [id number] IM RA 04/22/2019    Comments:    Hepatitis A07/24/2018 SKB HAVRIX-ADULT  IM  07/24/2018 07/20/2016   Comments: ndc [id number] tolerated well   InfluenzaRefused 04/22/2019 NE Not Entered  NE NE     Comments: pt refused   [id number] 04/22/2019 NE Not Entered  NE NE     Comments: pt refused   Prevnar 13Refused 04/22/2019 NE Not Entered  NE NE     Comments: pt refused   ShinglesRefused 04/22/2019 NE Not Entered  NE NE     Comments: pt refused   TdapUnknown NE Not Entered  IM NE 04/22/2019    Comments: pt unknown last inj         Review of Systems  · Constitutional  o Denies  o : fever, fatigue, weight loss, weight gain  · Cardiovascular  o Denies  o : lower extremity edema, claudication, chest pressure, palpitations  · Respiratory  o Admits  o : cough  o Denies  o : shortness of breath, wheezing, hemoptysis,  dyspnea on exertion  · Gastrointestinal  o Denies  o : nausea, vomiting, diarrhea, constipation, abdominal pain      Vitals  Date Time BP Position Site L\R Cuff Size HR RR TEMP (F) WT  HT  BMI kg/m2 BSA m2 O2 Sat        05/12/2020 10:11 /72 Sitting    86 - R  98.4     97 %          Physical Examination  · Constitutional  o Appearance  o : overweight, well developed, alert, in no acute distress  · Head and Face  o Head  o : normocephalic, atraumatic  · Ears, Nose, Mouth and Throat  o Ears  o :   § External Ears  § : external auditory canal appearance normal, no discharge present  § Otoscopic Examination  § : tympanic membranes pearly white/gray bilaterally  o Nose  o :   § External Nose  § : no lesions noted  § Nasopharynx  § : serous discharge present   o Oral Cavity  o :   § Oral Mucosa  § : oral mucosa light pink  o Throat  o :   § Oropharynx  § : tonsils without exudate, no palatal petechiae  · Neck  o Inspection/Palpation  o : normal appearance, no masses or tenderness, trachea midline  o Thyroid  o : gland size normal, nontender, no nodules or masses present on palpation  · Respiratory  o Respiratory Effort  o : breathing unlabored  o Inspection of Chest  o : chest rise symmetric bilaterally  o Auscultation of Lungs  o : clear to auscultation bilaterally throughout inspiration and expiration  · Cardiovascular  o Heart  o :   § Auscultation of Heart  § : regular rate and rhythm, no murmurs, gallops or rubs  o Peripheral Vascular System  o :   § Extremities  § : no edema  · Lymphatic  o Neck  o : no cervical lymphadenopathy, no supraclavicular lymphadenopathy  · Psychiatric  o Mood and Affect  o : mood normal, affect appropriate          Assessment  · Asthma     493.90/J45.909  · Cough     786.2/R05      Plan  · Orders  o ACO-39: Current medications updated and reviewed () - - 05/12/2020  · Medications  o Medrol (Pramod) 4 mg oral tablets,dose pack   SIG: take by oral route as directed per package  instructions   DISP: (1) 21 ct dose-pack with 0 refills  Discontinued on 05/12/2020     o Medications have been Reconciled  o Transition of Care or Provider Policy  · Instructions  o Take all medications as prescribed/directed.  o Patient instructed/educated on their diet and exercise program.  o Patient was educated/instructed on their diagnosis, treatment and medications prior to discharge from the clinic today.  o Patient counseled to reduce calorie intake.  o Patient was instructed to exercise regularly.  o Discussed Covid-19 precautions including, but not limited to, social distancing, avoid touching your face, and hand washing.   · Disposition  o Call or Return if symptoms worsen or persist.  o F/U in 4-6 months  o Care Transition            Electronically Signed by: Lenard Coleman PA-C -Author on May 12, 2020 11:00:24 AM

## 2021-05-14 VITALS
SYSTOLIC BLOOD PRESSURE: 121 MMHG | DIASTOLIC BLOOD PRESSURE: 87 MMHG | OXYGEN SATURATION: 97 % | TEMPERATURE: 97.6 F | HEART RATE: 94 BPM | HEIGHT: 72 IN

## 2021-05-14 VITALS
WEIGHT: 252 LBS | BODY MASS INDEX: 34.13 KG/M2 | HEART RATE: 89 BPM | OXYGEN SATURATION: 95 % | HEIGHT: 72 IN | DIASTOLIC BLOOD PRESSURE: 66 MMHG | SYSTOLIC BLOOD PRESSURE: 133 MMHG

## 2021-05-14 VITALS
HEIGHT: 72 IN | SYSTOLIC BLOOD PRESSURE: 127 MMHG | WEIGHT: 245.25 LBS | BODY MASS INDEX: 33.22 KG/M2 | HEART RATE: 78 BPM | DIASTOLIC BLOOD PRESSURE: 74 MMHG

## 2021-05-14 VITALS
HEART RATE: 95 BPM | SYSTOLIC BLOOD PRESSURE: 129 MMHG | TEMPERATURE: 97.6 F | OXYGEN SATURATION: 96 % | HEIGHT: 72 IN | DIASTOLIC BLOOD PRESSURE: 80 MMHG

## 2021-05-14 VITALS
TEMPERATURE: 97.6 F | HEIGHT: 72 IN | SYSTOLIC BLOOD PRESSURE: 118 MMHG | OXYGEN SATURATION: 96 % | DIASTOLIC BLOOD PRESSURE: 77 MMHG | HEART RATE: 84 BPM

## 2021-05-14 VITALS
DIASTOLIC BLOOD PRESSURE: 76 MMHG | WEIGHT: 252 LBS | HEART RATE: 78 BPM | SYSTOLIC BLOOD PRESSURE: 132 MMHG | BODY MASS INDEX: 34.13 KG/M2 | RESPIRATION RATE: 18 BRPM | HEIGHT: 72 IN

## 2021-05-14 VITALS
BODY MASS INDEX: 33.49 KG/M2 | OXYGEN SATURATION: 98 % | HEART RATE: 68 BPM | SYSTOLIC BLOOD PRESSURE: 118 MMHG | HEIGHT: 72 IN | DIASTOLIC BLOOD PRESSURE: 75 MMHG | TEMPERATURE: 97.9 F | WEIGHT: 247.25 LBS

## 2021-05-14 VITALS
SYSTOLIC BLOOD PRESSURE: 125 MMHG | TEMPERATURE: 94.9 F | HEIGHT: 72 IN | DIASTOLIC BLOOD PRESSURE: 76 MMHG | HEART RATE: 80 BPM

## 2021-05-14 VITALS
WEIGHT: 250 LBS | OXYGEN SATURATION: 96 % | HEIGHT: 72 IN | DIASTOLIC BLOOD PRESSURE: 73 MMHG | BODY MASS INDEX: 33.86 KG/M2 | HEART RATE: 77 BPM | SYSTOLIC BLOOD PRESSURE: 125 MMHG | TEMPERATURE: 97.5 F

## 2021-05-14 VITALS
HEIGHT: 72 IN | HEART RATE: 87 BPM | SYSTOLIC BLOOD PRESSURE: 140 MMHG | TEMPERATURE: 97.3 F | DIASTOLIC BLOOD PRESSURE: 73 MMHG | OXYGEN SATURATION: 98 %

## 2021-05-14 VITALS
BODY MASS INDEX: 33.59 KG/M2 | HEART RATE: 87 BPM | OXYGEN SATURATION: 96 % | HEIGHT: 72 IN | DIASTOLIC BLOOD PRESSURE: 69 MMHG | WEIGHT: 248 LBS | TEMPERATURE: 97.6 F | SYSTOLIC BLOOD PRESSURE: 126 MMHG

## 2021-05-14 NOTE — PROGRESS NOTES
Progress Note      Patient Name: Collins Brumfield   Patient ID: [id number]   Sex: Male   YOB: 1950    Primary Care Provider: Lenard Coleman PA-C   Referring Provider: Lenard Coleman PA-C    Visit Date: April 26, 2021    Provider: Derek Osuna MD   Location: [facility]   Location Address: [address] [place] [address]  [id number]   Location Phone: [phone]          Chief Complaint     BLE weakness with falls       History Of Present Illness  Collins Brumfield is a 71 year old /White male who presents today to [facility] today referred from Lenard Coleman PA-C.      71-year-old man here for follow-up his gait abnormalities, MRI of the brain, laboratory work-up.  He states that his fall and since last clinic visit.  He either fall sideways or forwards.  He states that when he goes down the steps he has to be careful and has to count the steps as well as look at the steps.  He had problems with an incline and he feels off balance.  I reviewed MRI of the brain images and showing white matter changes.  It has progressed since 2016.  He has a history of hyperlipidemia however is not taking any medications.  He has no history of hypertension, diabetes and he does not smoke.  His laboratory work-up was unremarkable for evidence for diabetes, B12 deficiency, immunofixation electrophoresis, markers for vasculitis, Lyme's disease.       Past Medical History  ***No Significant Medical History; Aftercare following surgery; Allergic rhinitis due to allergen; Asthma; Erectile dysfunction; Foot fracture, right; Heel pain; Hyperlipidemia; Ingrown toenail; Insomnia, unspecified; Lumbago/low back pain; Lumbar Spinal Stenosis; Sleep apnea; Surgery follow-up; Synovial cyst, unspecified         Past Surgical History  Back surgery; Back Surgery, Lumbar; Colonoscopy; Hand surgery; Lumbar laminectomy; Tonsilectomy         Medication List  tadalafil 20 mg oral tablet          Allergy List  NO KNOWN DRUG ALLERGIES       Allergies Reconciled  Family Medical History  Family history of cancer         Social History  Alcohol (Current some day); lives with spouse; ; No known infection risk; Retired; Tobacco (Never)         Immunizations  Name Date Admin   Hepatitis A 04/22/2019   Hepatitis A 07/24/2018   Influenza Refused   [id number] Refused   Prevnar 13 Refused   Shingles Refused   Tdap 04/22/2019         Review of Systems  · Constitutional  o Denies  o : chills, excessive sweating, fatigue, fever, sycope/passing out, weight gain, weight loss  · Eyes  o Denies  o : changes in vision, blurry vision, double vision  · HENT  o Denies  o : loss of hearing, ringing in the ears, ear aches, sore throat, nasal congestion, sinus pain, nose bleeds, seasonal allergies  · Cardiovascular  o Denies  o : blood clots, swollen legs, anemia, easy burising or bleeding, transfusions  · Respiratory  o Denies  o : shortness of breath, dry cough, productive cough, pneumonia, COPD  · Gastrointestinal  o Denies  o : difficulty swallowing, reflux  · Genitourinary  o Denies  o : incontinence  · Neurologic  o Admits  o : falls, difficulty with coordination, weakness  o Denies  o : headache, seizure, stroke, tremor, loss of balance, dizziness/vertigo, difficulty with sleep, numbness/tingling/paresthesia , difficulty with dexterity  · Musculoskeletal  o Admits  o : weakness  o Denies  o : neck stiffness/pain, swollen lymph nodes, muscle aches, joint pain, spasms, sciatica, pain radiating in arm, pain radiating in leg, low back pain  · Endocrine  o Denies  o : diabetes, thyroid disorder  · Psychiatric  o Denies  o : anxiety, depression      Vitals  Date Time BP Position Site L\R Cuff Size HR RR TEMP (F) WT  HT  BMI kg/m2 BSA m2 O2 Sat FR L/min FiO2 HC       04/26/2021 02:54 /76 Sitting    78 - R 18  251lbs 16oz 6'   34.18 2.41             Physical Examination     He is alert, fluent, phasic, follows  commands well.  Reflexes are absent in the patellar's and ankles.  Sensation symmetrical to pinprick.  There is no sensory loss in a stocking distribution.  Vibration is impaired higher than the ankles.  Romberg is negative.  He is unable to stand on one leg without losing his balance after 3 seconds.           Assessment  · MRI of brain abnormal     793.0/R90.89  · Peripheral neuropathy     356.9/G62.9  Nerve conduction studies abnormal shows electrophysiologic evidence for mild axonal sensorimotor polyneuropathy. EMG study shows evidence for mild axonal loss in distal muscles tested.  · Unsteadiness     781.2/R26.81  I discussed with him and his wife that his unsteadiness is combination of small vessel disease seen on the MRI of the brain as well as axonal sensorimotor polyneuropathy. I would recommend for him to use a cane or walking stick for balance. I will order an CTA of the brain and carotids to assess for large vessel pathology. They her to call the office to find out the results. Otherwise I will see him again in 3 months time for follow-up.    Total time spent with patient coordinating patient care was 25 minutes.      Plan  · Orders  o CTA Head with IV Contrast [facility]; no Oral Prep (19545) - 781.2/R26.81, 356.9/G62.9, 793.0/R90.89 - 04/26/2021  o CTA Neck (Not Cervical Spine) without and with IV Contrast [facility]; no Oral Prep (16797) - 781.2/R26.81, 356.9/G62.9, 793.0/R90.89 - 04/26/2021  o 2 - Muscle test done with Nerve test Limited (14233) - 781.2/R26.81, 356.9/G62.9 - 04/26/2021  o Nerve conduction studies; 7-8 studies (67963) - 781.2/R26.81, 356.9/G62.9 - 04/26/2021  · Medications  o Medications have been Reconciled  o Transition of Care or Provider Policy  · Instructions  o Encouraged to follow-up with Primary Care Provider for preventative care.            Electronically Signed by: Derek Osuna MD -Author on April 26, 2021 05:30:25 PM

## 2021-05-14 NOTE — PROGRESS NOTES
Progress Note      Patient Name: Collins Brumfield   Patient ID: [id number]   Sex: Male   YOB: 1950    Primary Care Provider: Lenard Coleman PA-C   Referring Provider: Lenard Coleman PA-C    Visit Date: March 4, 2021    Provider: Derek Osuna MD   Location: [facility]   Location Address: [address] [place] [address]  [id number]   Location Phone: [phone]          Chief Complaint     Pt here for f/u due to recent fall.       History Of Present Illness  Collins Brumfield is a 70 year old /White male who presents today to [facility] today referred from Lenard Coleman PA-C.      70-year-old man evaluated for falls.  I saw him last year for 1 fall and now he has fallen down twice in September.  He fell and going down the steps.  He states that sometimes he is off balance.  It is several times a week.  He has a history of diabetes.  Had an MRI of the brain in 2016 which was unremarkable.  Cervical spine shows moderate to severe spinal stenosis at C5-C6.  He had recent laboratory work-up which was unremarkable.  He has no numbness and tingling in his feet.       Past Medical History  ***No Significant Medical History; Aftercare following surgery; Allergic rhinitis due to allergen; Asthma; Erectile dysfunction; Foot fracture, right; Heel pain; Hyperlipidemia; Ingrown toenail; Insomnia, unspecified; Lumbago/low back pain; Lumbar Spinal Stenosis; Sleep apnea; Surgery follow-up; Synovial cyst, unspecified         Past Surgical History  Back surgery; Back Surgery, Lumbar; Colonoscopy; Hand surgery; Lumbar laminectomy; Tonsilectomy         Medication List  tadalafil 20 mg oral tablet         Allergy List  NO KNOWN DRUG ALLERGIES         Family Medical History  Family history of cancer         Social History  Alcohol (Current some day); lives with spouse; ; No known infection risk; Retired; Tobacco (Never)         Immunizations  Name Date Admin    Hepatitis A 04/22/2019   Hepatitis A 07/24/2018   Influenza Refused   [id number] Refused   Prevnar 13 Refused   Shingles Refused   Tdap 04/22/2019         Review of Systems  · Constitutional  o Denies  o : chills, excessive sweating, fatigue, fever, sycope/passing out, weight gain, weight loss  · Eyes  o Denies  o : changes in vision, blurred vision, double vision  · HENT  o Denies  o : hearing loss, ringing in the ears, ear aches, sore throat, nasal congestion, sinus pain, nose bleeds, seasonal allergies  · Cardiovascular  o Denies  o : blood clots, swollen legs, anemia, easy burising or bleeding, transfusions  · Respiratory  o Denies  o : shortness of breath, dry cough, productive cough, pneumonia, COPD  · Gastrointestinal  o Denies  o : dysphagia, reflux  · Genitourinary  o Denies  o : incontinence  · Neurologic  o Admits  o : loss of balance, falls, numbness/tingling/paresthesia   o Denies  o : headache, seizure, stroke, tremor, dizziness/vertigo, difficulty with sleep, difficulty with coordination, difficulty with dexterity, weakness  · Musculoskeletal  o Denies  o : neck stiffness/pain, swollen lymph nodes, muscle aches, joint pain, weakness, spasms, sciatica, pain radiating in arm, pain radiating in leg, low back pain  · Endocrine  o Denies  o : diabetes, thyroid disorder  · Psychiatric  o Denies  o : anxiety, depression      Vitals  Date Time BP Position Site L\R Cuff Size HR RR TEMP (F) WT  HT  BMI kg/m2 BSA m2 O2 Sat FR L/min FiO2 HC       03/04/2021 08:46 /74 Sitting    78 - R   245lbs 4oz 6'   33.26 2.38             Physical Examination     Alert, fluent, phasic, follows commands well.  There is no weakness of the upper or lower extremities individual muscle testing.  Reflexes are absent in the biceps, triceps, patellar's and ankles.  Sensation is symmetrically decreased to pinprick in the lower extremities without a sensory level and there is no sensory level in his back or neck.  Station gait  he is able to tiptoe, heel walk without weakness but he feels off balance.  He is unable to tandem.  Is unable to stand up on 1 leg.  He is able to get up from a chair with arms crossed without difficulty.           Assessment  · Peripheral neuropathy     356.9/G62.9  I will do a neuropathy work-up and schedule him to have nerve conduction study. This most likely etiology for his off balance and falls. Total time spent with patient coordinating care was 40 minutes.  · Falls infrequently     V15.88/Z91.81  He has had cervical spondylosis noted on his last MRI of the cervical spine. I will repeat MRI of the cervical spine. I will also repeat an MRI of the brain to see if he has any evidence for normal pressure hydrocephalus or significant white matter changes.      Plan  · Orders  o MRI brain wo contrast (34007) - V15.88/Z91.81, 356.9/G62.9 - 03/04/2021  o MRI cervical spine wo then w contrast (33169) - V15.88/Z91.81, 356.9/G62.9 - 03/04/2021  o Hgb A1c [facility] (33850) - V15.88/Z91.81, 356.9/G62.9 - 03/04/2021  o Vitamin B12 level (76431) - V15.88/Z91.81, 356.9/G62.9 - 03/04/2021  o Serum electrophoresis (28905) - V15.88/Z91.81, 356.9/G62.9 - 03/04/2021  o Immunofixation electrophoresis; serum (82186) - V15.88/Z91.81, 356.9/G62.9 - 03/04/2021  o RICHELLE (antinuclear antibody profile) by enzyme immunoassay (26873) - V15.88/Z91.81, 356.9/G62.9 - 03/04/2021  o ESR (41113) - V15.88/Z91.81, 356.9/G62.9 - 03/04/2021  o Rheumatoid Factor IgG, IgM, and IgA [facility] (51880) - V15.88/Z91.81, 356.9/G62.9 - 03/04/2021  o Hu antibody assay (85232) - V15.88/Z91.81, 356.9/G62.9 - 03/04/2021  o Lyme disease IgM antibody assay by Western blot (40335) - V15.88/Z91.81, 356.9/G62.9 - 03/04/2021  · Medications  o Medications have been Reconciled  o Transition of Care or Provider Policy  · Instructions  o Encouraged to follow-up with Primary Care Provider for preventative care.            Electronically Signed by: Derek Osuna MD -Author on  March 4, 2021 09:24:13 AM

## 2021-05-14 NOTE — PROGRESS NOTES
Progress Note      Patient Name: Collins Brumfield   Patient ID: [id number]   Sex: Male   YOB: 1950    Primary Care Provider: Lenard Coleman PA-C   Referring Provider: Lenard Coleman PA-C    Visit Date: February 15, 2021    Provider: Lenard Coleman PA-C   Location: [facility]   Location Address: [address] [place] [address], [address]  [place], [place]  [id number]   Location Phone: [phone]          Chief Complaint  · 6 month follow up      History Of Present Illness  Video Conferencing Visit  Collins Brumfield is a 70 year old /White male who is presenting for evaluation via video conferencing via [employer]. Verbal consent obtained before beginning visit.   The following staff were present during this visit: Iman Gomez CMA/Lenard Coleman PA-C   Collins Brumfield is a 70 year old /White male who presents for evaluation and treatment of: 6 month follow up.      pt presents today for 6 month follow up.    pt states no other issues or concerns to discuss today.    Labs 8/20  flu refused    Pt is doing well overall    Pt is leaving for [place].    No CP, SOA, HA    ED - well controlled with tadalafil, avoid NTG  Refuses pneumonia vaccines    Obesity - discussed diet and exercise       Past Medical History  Disease Name Date Onset Notes   ***No Significant Medical History --  --    Aftercare following surgery 11/04/2020 --    Allergic rhinitis due to allergen 04/30/2020 --    Asthma 04/30/2020 --    Erectile dysfunction 08/16/2020 --    Foot fracture, right --  --    Heel pain --  --    Ingrown toenail --  --    Insomnia, unspecified 07/26/2019 --    Lumbago/low back pain 06/02/2016 --    Lumbar Spinal Stenosis --  --    Sleep apnea 09/30/2019 --    Surgery follow-up 10/07/2020 --    Synovial cyst, unspecified 06/02/2016 right L2/3          Past Surgical History  Procedure Name Date Notes   Back surgery --  --    Back Surgery, Lumbar 8 years? --    Colonoscopy 2018 normal   Hand surgery  --  --    Lumbar laminectomy 5/16/2016 L2-3   Tonsilectomy --  --          Medication List  Name Date Started Instructions   tadalafil 20 mg oral tablet 02/15/2021 take 1 tablet (20 mg) by oral route as needed approximately 1 hour before sexual activity         Allergy List  Allergen Name Date Reaction Notes   NO KNOWN DRUG ALLERGIES --  --  --          Family Medical History  Disease Name Relative/Age Notes   Family history of cancer Brother/  Father/   Father; Brother         Social History  Finding Status Start/Stop Quantity Notes   Alcohol Current some day --/-- --  11/12/2020 - 12/06/2019 - 10/28/2019 - rarely drinks, less than 1 drink per day   lives with spouse --  --/-- --  --     --  --/-- --  --    No known infection risk --  --/-- --  --    Retired --  --/-- --  --    Tobacco Never --/-- --  11/12/2020 -          Immunizations  NameDate Admin Mfg Trade Name Lot Number Route Inj VIS Given VIS Publication   Hepatitis A04/22/2019 SKB HAVRIX-ADULT [id number] IM RA 04/22/2019    Comments:    Hepatitis A07/24/2018 SKB HAVRIX-ADULT  IM  07/24/2018 07/20/2016   Comments: ndc [id number] tolerated well   InfluenzaRefused 04/22/2019 NE Not Entered  NE NE     Comments: pt refused   [id number] 04/22/2019 NE Not Entered  NE NE     Comments: pt refused   Prevnar 13Refused 04/22/2019 NE Not Entered  NE NE     Comments: pt refused   ShinglesRefused 04/22/2019 NE Not Entered  NE NE     Comments: pt refused   Tdap04/22/2019 NE Not Entered  IM NE 04/22/2019    Comments: pt unknown last inj         Review of Systems  · Constitutional  o Denies  o : fever, fatigue, weight loss, weight gain  · Cardiovascular  o Denies  o : lower extremity edema, claudication, chest pressure, palpitations  · Respiratory  o Denies  o : shortness of breath, wheezing, cough, hemoptysis, dyspnea on exertion  · Gastrointestinal  o Denies  o : nausea, vomiting, diarrhea, constipation, abdominal pain      Physical  Examination  · Constitutional  o Appearance  o : overweight, well developed, alert, in no acute distress  · Head and Face  o Head  o :   § Inspection  § : atraumatic, normocephalic  · Respiratory  o Respiratory Effort  o : breathing comfortably, no cough  · Skin and Subcutaneous Tissue  o General Inspection  o : no visible rash, no lesions  · Neurologic  o Mental Status Examination  o :   § Orientation  § : grossly oriented to person, place and time, no facial droop          Assessment  · Obesity     278.00/E66.9  · Need for influenza vaccination     V04.81/Z23  · Erectile dysfunction     607.84/N52.9  · Sleep apnea     780.57/G47.30      Plan  · Orders  o ACO-14: Influenza immunization was not administered for reasons documented () - V04.81/Z23 - 02/15/2021   refused  o Free T4 (09975) - - 02/15/2021  o Male Physical Primary Care Panel (CMP, CBC, TSH, Lipid, PSA) [facility] (40667, 89702, , 39295, 71967, 64601) - - 02/15/2021  o Urinalysis with Reflex Microscopy ([facility]) (27381) - - 02/15/2021  o Vitamin D (25-Hydroxy) Level (92171) - - 02/15/2021  o ACO-13: Fall Risk Screening with no falls in past year or only one fall without injury in the past year (1101F) - - 02/15/2021  o ACO-39: Current medications updated and reviewed (1159F, ) - - 02/15/2021  · Medications  o tadalafil 20 mg oral tablet   SIG: take 1 tablet (20 mg) by oral route as needed approximately 1 hour before sexual activity   DISP: (30) Tablet with 0 refills  Refilled on 02/15/2021     o Medications have been Reconciled  o Transition of Care or Provider Policy  · Instructions  o Flu vaccine declined.  o Take all medications as prescribed/directed.  o Patient instructed/educated on their diet and exercise program.  o Patient was educated/instructed on their diagnosis, treatment and medications prior to discharge from the clinic today.  o Discussed Covid-19 precautions including, but not limited to, social distancing, avoid touching your face,  and hand washing.   · Disposition  o Call or Return if symptoms worsen or persist.  o F/U in 6 months  o Care Transition            Electronically Signed by: Lenard Coleman PA-C -Author on February 19, 2021 05:50:29 AM

## 2021-05-14 NOTE — PROGRESS NOTES
Progress Note      Patient Name: Collins Brumfield   Patient ID: [id number]   Sex: Male   YOB: 1950    Primary Care Provider: Lenard Coleman PA-C   Referring Provider: Lenard Coleman PA-C    Visit Date: May 3, 2021    Provider: Lenard Coleman PA-C   [place] [facility] [place]   Location Address: [address] [place] [address], [address]  [place], [place]  [id number]   Location Phone: [phone]          Chief Complaint  · Congestion, cough, sneezing      History Of Present Illness  Collins Brumfield is a 71 year old /White male who presents for evaluation and treatment of: congestion, cough, sneezing      Pt presents today for congestion/cough.     Pt notes symptoms first began about a week and a half ago. Pt c/o congestion, productive cough with yellow sputum, sneezing and fever of 100.0.     Pt denies any body aches, chills or exposure to COVID-19.     Labs-3/2021    Pt refuses COVID-19 vaccine.    Pt states that for the past week he has had some sinus congestion, sneezing, st, cough, non prod then became prod, some fever, no change in taste or smell.    Using his neb - helped some       Past Medical History  Disease Name Date Onset Notes   ***No Significant Medical History --  --    Aftercare following surgery 11/04/2020 --    Allergic rhinitis due to allergen 04/30/2020 --    Asthma 04/30/2020 --    Erectile dysfunction 08/16/2020 --    Foot fracture, right --  --    Heel pain --  --    Hyperlipidemia 02/25/21 --    Ingrown toenail --  --    Insomnia, unspecified 07/26/2019 --    Lumbago/low back pain 06/02/2016 --    Lumbar Spinal Stenosis --  --    Sleep apnea 09/30/2019 --    Surgery follow-up 10/07/2020 --    Synovial cyst, unspecified 06/02/2016 right L2/3          Past Surgical History  Procedure Name Date Notes   Back surgery --  --    Back Surgery, Lumbar 8 years? --    Colonoscopy 2018 normal   Hand surgery --  --    Lumbar laminectomy 5/16/2016 L2-3   Tonsilectomy --  --           Medication List  Name Date Started Instructions   albuterol sulfate 90 mcg/actuation inhalation HFA aerosol inhaler  inhale 1 puff (90 mcg) by inhalation route every 6 hours as needed   tadalafil 20 mg oral tablet 02/15/2021 take 1 tablet (20 mg) by oral route as needed approximately 1 hour before sexual activity         Allergy List  Allergen Name Date Reaction Notes   NO KNOWN DRUG ALLERGIES --  --  --        Allergies Reconciled  Family Medical History  Disease Name Relative/Age Notes   Family history of cancer Brother/  Father/   Father; Brother         Social History  Finding Status Start/Stop Quantity Notes   Alcohol Current some day --/-- --  11/12/2020 - 12/06/2019 - 10/28/2019 - rarely drinks, less than 1 drink per day   lives with spouse --  --/-- --  --     --  --/-- --  --    No known infection risk --  --/-- --  --    Retired --  --/-- --  --    Tobacco Never --/-- --  11/12/2020 -          Immunizations  NameDate Admin Mfg Trade Name Lot Number Route Inj VIS Given VIS Publication   Hepatitis A04/22/2019 SKB HAVRIX-ADULT [id number] IM RA 04/22/2019    Comments:    Hepatitis A07/24/2018 SKB HAVRIX-ADULT  IM  07/24/2018 07/20/2016   Comments: ndc [id number] tolerated well   InfluenzaRefused 04/22/2019 NE Not Entered  NE NE     Comments: pt refused   Jhofmyxao92Qyliaif 04/22/2019 NE Not Entered  NE NE     Comments: pt refused   Prevnar 13Refused 04/22/2019 NE Not Entered  NE NE     Comments: pt refused   ShinglesRefused 04/22/2019 NE Not Entered  NE NE     Comments: pt refused   Tdap04/22/2019 NE Not Entered  IM NE 04/22/2019    Comments: pt unknown last inj         Review of Systems  · Constitutional  o Admits  o : fatigue  o Denies  o : fever, weight loss, weight gain  · Cardiovascular  o Denies  o : lower extremity edema, claudication, chest pressure, palpitations  · Respiratory  o Admits  o : shortness of breath, wheezing, cough  o Denies  o : hemoptysis, dyspnea on  exertion  · Gastrointestinal  o Denies  o : nausea, vomiting, diarrhea, constipation, abdominal pain      Vitals  Date Time BP Position Site L\R Cuff Size HR RR TEMP (F) WT  HT  BMI kg/m2 BSA m2 O2 Sat FR L/min FiO2        05/03/2021 11:14 /66 Sitting    89 - R   251lbs 16oz 6'   34.18 2.41 95 %            Physical Examination  · Constitutional  o Appearance  o : well developed, well-nourished, no acute distress  · Head and Face  o Head  o : normocephalic, atraumatic  · Ears, Nose, Mouth and Throat  o Ears  o :   § External Ears  § : external auditory canal appearance normal, no discharge present  § Otoscopic Examination  § : tympanic membranes pearly white/gray bilaterally  o Nose  o :   § External Nose  § : no lesions noted  § Nasopharynx  § : purulent discharge present   o Oral Cavity  o :   § Oral Mucosa  § : oral mucosa light pink  o Throat  o :   § Oropharynx  § : tonsils without exudate, no palatal petechiae  · Neck  o Inspection/Palpation  o : normal appearance, no masses or tenderness, trachea midline  o Thyroid  o : gland size normal, nontender, no nodules or masses present on palpation  · Respiratory  o Respiratory Effort  o : breathing unlabored  o Inspection of Chest  o : chest rise symmetric bilaterally  o Auscultation of Lungs  o : wheezes heard   · Cardiovascular  o Heart  o :   § Auscultation of Heart  § : regular rate and rhythm, no murmurs, gallops or rubs  o Peripheral Vascular System  o :   § Extremities  § : no edema  · Lymphatic  o Neck  o : no cervical lymphadenopathy, no supraclavicular lymphadenopathy  · Psychiatric  o Mood and Affect  o : mood normal, affect appropriate          Assessment  · Cough     786.2/R05  · Congested nose     478.19/R09.81  · Sneezing     784.99/R06.7  · Fever     780.60/R50.9  · Bronchopneumonia     485/J18.0      Plan  · Orders  o Chest xray 2 views [facility] (63816) - 786.2/R05 - 05/03/2021  o IM - Injection Fee [facility] (33745) - - 05/03/2021  o ACO - Pt declines  to or was not able to provide an Advance Care Plan or name a Surrogate Decision Maker (1124F) - - 05/03/2021  o ACO-39: Current medications updated and reviewed (, 1159F) - - 05/03/2021  o Depo-Medrol Injection 40mg () - - 05/03/2021   Injection - Depo Medrol 40 mg; Dose: 40 mg; Site: Right Gluteus; Route: intramuscular; Date: 05/03/2021 11:55:13; Exp: 09/01/2021; Lot: [id number]; Mfg: N/A; TradeName: methylprednisolone; [place] [facility] [place]; Administered By: Nino Ornelas MA; Comment: pt tolerated well  · Medications  o levofloxacin 500 mg oral tablet   SIG: take 1 tablet (500 mg) by oral route once daily for 10 days   DISP: (10) Tablet with 0 refills  Prescribed on 05/03/2021     o Medications have been Reconciled  o Transition of Care or Provider Policy  · Instructions  o Take all medications as prescribed/directed.  o Patient instructed/educated on their diet and exercise program.  o Rest. Increase Fluids.  o Patient was educated/instructed on their diagnosis, treatment and medications prior to discharge from the clinic today.  o Patient counseled to reduce calorie intake.  o Discussed Covid-19 precautions including, but not limited to, social distancing, avoid touching your face, and hand washing.   · Disposition  o Call or Return if symptoms worsen or persist.  o Care Transition            Electronically Signed by: Lenard Coleman PA-C -Author on May 3, 2021 08:57:18 PM

## 2021-05-15 VITALS
HEART RATE: 63 BPM | SYSTOLIC BLOOD PRESSURE: 122 MMHG | DIASTOLIC BLOOD PRESSURE: 84 MMHG | WEIGHT: 227.25 LBS | OXYGEN SATURATION: 99 % | HEIGHT: 72 IN | BODY MASS INDEX: 30.78 KG/M2 | RESPIRATION RATE: 20 BRPM

## 2021-05-15 VITALS
SYSTOLIC BLOOD PRESSURE: 104 MMHG | BODY MASS INDEX: 28.36 KG/M2 | HEIGHT: 72 IN | WEIGHT: 209.37 LBS | HEART RATE: 67 BPM | DIASTOLIC BLOOD PRESSURE: 66 MMHG | OXYGEN SATURATION: 99 %

## 2021-05-15 VITALS
HEART RATE: 69 BPM | WEIGHT: 225 LBS | BODY MASS INDEX: 30.48 KG/M2 | SYSTOLIC BLOOD PRESSURE: 133 MMHG | HEIGHT: 72 IN | DIASTOLIC BLOOD PRESSURE: 72 MMHG

## 2021-05-15 VITALS
HEART RATE: 73 BPM | OXYGEN SATURATION: 98 % | HEIGHT: 72 IN | BODY MASS INDEX: 30.2 KG/M2 | WEIGHT: 223 LBS | SYSTOLIC BLOOD PRESSURE: 131 MMHG | DIASTOLIC BLOOD PRESSURE: 76 MMHG

## 2021-05-15 VITALS
BODY MASS INDEX: 29.05 KG/M2 | HEART RATE: 77 BPM | SYSTOLIC BLOOD PRESSURE: 114 MMHG | WEIGHT: 214.5 LBS | DIASTOLIC BLOOD PRESSURE: 68 MMHG | OXYGEN SATURATION: 97 % | HEIGHT: 72 IN

## 2021-05-15 VITALS
OXYGEN SATURATION: 98 % | BODY MASS INDEX: 28.49 KG/M2 | HEART RATE: 67 BPM | HEIGHT: 72 IN | WEIGHT: 210.37 LBS | SYSTOLIC BLOOD PRESSURE: 124 MMHG | DIASTOLIC BLOOD PRESSURE: 73 MMHG

## 2021-05-15 VITALS
OXYGEN SATURATION: 97 % | HEART RATE: 86 BPM | DIASTOLIC BLOOD PRESSURE: 72 MMHG | SYSTOLIC BLOOD PRESSURE: 146 MMHG | TEMPERATURE: 98.4 F

## 2021-05-15 VITALS
WEIGHT: 250.12 LBS | HEART RATE: 78 BPM | BODY MASS INDEX: 33.88 KG/M2 | OXYGEN SATURATION: 97 % | HEIGHT: 72 IN | SYSTOLIC BLOOD PRESSURE: 112 MMHG | DIASTOLIC BLOOD PRESSURE: 74 MMHG

## 2021-05-15 VITALS
HEIGHT: 72 IN | DIASTOLIC BLOOD PRESSURE: 67 MMHG | HEART RATE: 71 BPM | SYSTOLIC BLOOD PRESSURE: 112 MMHG | WEIGHT: 216 LBS | BODY MASS INDEX: 29.26 KG/M2

## 2021-05-15 VITALS
BODY MASS INDEX: 33.32 KG/M2 | DIASTOLIC BLOOD PRESSURE: 73 MMHG | TEMPERATURE: 97.6 F | SYSTOLIC BLOOD PRESSURE: 139 MMHG | HEART RATE: 74 BPM | WEIGHT: 246 LBS | HEIGHT: 72 IN | OXYGEN SATURATION: 97 %

## 2021-05-15 VITALS
HEIGHT: 72 IN | BODY MASS INDEX: 34.32 KG/M2 | SYSTOLIC BLOOD PRESSURE: 135 MMHG | HEART RATE: 72 BPM | DIASTOLIC BLOOD PRESSURE: 70 MMHG | OXYGEN SATURATION: 95 % | WEIGHT: 253.37 LBS

## 2021-05-16 VITALS
DIASTOLIC BLOOD PRESSURE: 70 MMHG | HEART RATE: 70 BPM | HEIGHT: 72 IN | SYSTOLIC BLOOD PRESSURE: 110 MMHG | OXYGEN SATURATION: 96 % | WEIGHT: 229 LBS | BODY MASS INDEX: 31.02 KG/M2

## 2021-05-16 VITALS
TEMPERATURE: 97.8 F | DIASTOLIC BLOOD PRESSURE: 68 MMHG | HEIGHT: 72 IN | WEIGHT: 240 LBS | HEART RATE: 81 BPM | BODY MASS INDEX: 32.51 KG/M2 | RESPIRATION RATE: 18 BRPM | SYSTOLIC BLOOD PRESSURE: 126 MMHG | OXYGEN SATURATION: 95 %

## 2021-05-16 VITALS — WEIGHT: 240.37 LBS | BODY MASS INDEX: 32.56 KG/M2 | HEIGHT: 72 IN | RESPIRATION RATE: 14 BRPM

## 2021-05-25 ENCOUNTER — TELEMEDICINE CONVERTED (OUTPATIENT)
Dept: FAMILY MEDICINE CLINIC | Facility: CLINIC | Age: 71
End: 2021-05-25
Attending: PHYSICIAN ASSISTANT

## 2021-06-05 NOTE — PROGRESS NOTES
Progress Note      Patient Name: Collins Brumfield   Patient ID: [id number]   Sex: Male   YOB: 1950    Primary Care Provider: Lenard Coleman PA-C   Referring Provider: Lenard Coleman PA-C    Visit Date: May 25, 2021    Provider: Lenard Coleman PA-C   [place] [facility] [place]   Location Address: [address] [place] [address], [address]  [place], [place]  [id number]   Location Phone: [phone]          Chief Complaint  · dry cough  · asthma      History Of Present Illness  Video Conferencing Visit  Collins Brumfield is a 71 year old /White male who is presenting for evaluation via video conferencing via [employer]. Verbal consent obtained before beginning visit.   The following staff were present during this visit: Iman Gomez CMA/Lenard Coleman PA-C   Collins Brumfield is a 71 year old /White male who presents for evaluation and treatment of: dry cough and asthma issues.      pt presents today for dry cough w/asthma issues.    pt states he was seen on 5/3 for the same issues.    pt c/o dry cough worse when laying down and some soa.   denies any fever, body aches and hasn't had taste or smell for years.    pt has not had any Covid vaccines     pt is requesting for albuterol for his neb and inh to be sent in    Labs 3/21    Pt was tx with levaquin and steroids - he felt great until 2 weeks ago.  Pt states that two weeks ago he started with wheezing, he then restarted using his albuterol.  The inhaler helped, used neb, but only twice.  Asthma is flaring up.       Past Medical History  Disease Name Date Onset Notes   ***No Significant Medical History --  --    Aftercare following surgery 11/04/2020 --    Allergic rhinitis due to allergen 04/30/2020 --    Asthma 04/30/2020 --    Erectile dysfunction 08/16/2020 --    Foot fracture, right --  --    Heel pain --  --    Hyperlipidemia 02/25/21 --    Ingrown toenail --  --    Insomnia, unspecified 07/26/2019 --    Lumbago/low back pain 06/02/2016 --     Lumbar Spinal Stenosis --  --    Sleep apnea 09/30/2019 --    Surgery follow-up 10/07/2020 --    Synovial cyst, unspecified 06/02/2016 right L2/3          Past Surgical History  Procedure Name Date Notes   Back surgery --  --    Back Surgery, Lumbar 8 years? --    Colonoscopy 2018 normal   Hand surgery --  --    Lumbar laminectomy 5/16/2016 L2-3   Tonsilectomy --  --          Medication List  Name Date Started Instructions   albuterol sulfate 90 mcg/actuation inhalation HFA aerosol inhaler 05/25/2021 inhale 1 puff (90 mcg) by inhalation route every 6 hours as needed   tadalafil 20 mg oral tablet 02/15/2021 take 1 tablet (20 mg) by oral route as needed approximately 1 hour before sexual activity         Allergy List  Allergen Name Date Reaction Notes   NO KNOWN DRUG ALLERGIES --  --  --        Allergies Reconciled  Family Medical History  Disease Name Relative/Age Notes   Family history of cancer Brother/  Father/   Father; Brother         Social History  Finding Status Start/Stop Quantity Notes   Alcohol Current some day --/-- --  11/12/2020 - 12/06/2019 - 10/28/2019 - rarely drinks, less than 1 drink per day   lives with spouse --  --/-- --  --     --  --/-- --  --    No known infection risk --  --/-- --  --    Retired --  --/-- --  --    Tobacco Never --/-- --  11/12/2020 -          Immunizations  NameDate Admin Mfg Trade Name Lot Number Route Inj VIS Given VIS Publication   Hepatitis A04/22/2019 SKB HAVRIX-ADULT [id number] IM RA 04/22/2019    Comments:    Hepatitis A07/24/2018 SKB HAVRIX-ADULT  IM  07/24/2018 07/20/2016   Comments: ndc [id number] tolerated well   InfluenzaRefused 04/22/2019 NE Not Entered  NE NE     Comments: pt refused   [id number] 04/22/2019 NE Not Entered  NE NE     Comments: pt refused   Prevnar 13Refused 04/22/2019 NE Not Entered  NE NE     Comments: pt refused   ShinglesRefused 04/22/2019 NE Not Entered  NE NE     Comments: pt refused   Tdap04/22/2019 NE Not Entered  IM  NE 04/22/2019    Comments: pt unknown last inj         Review of Systems  · Constitutional  o Denies  o : fever, fatigue, weight loss, weight gain  · Cardiovascular  o Denies  o : lower extremity edema, claudication, chest pressure, palpitations  · Respiratory  o Denies  o : shortness of breath, wheezing, cough, hemoptysis, dyspnea on exertion  · Gastrointestinal  o Denies  o : nausea, vomiting, diarrhea, constipation, abdominal pain      Physical Examination  · Constitutional  o Appearance  o : well-nourished, no acute distress  · Head and Face  o Head  o :   § Inspection  § : atraumatic, normocephalic  · Respiratory  o Respiratory Effort  o : breathing unlabored  · Skin and Subcutaneous Tissue  o General Inspection  o : no visible rash, no lesions  · Neurologic  o Mental Status Examination  o :   § Orientation  § : grossly oriented to person, place and time, no facial droop          Assessment  · Allergic rhinitis due to allergen     477.9/J30.9  · Asthma     493.90/J45.909  · Cough     786.2/R05      Plan  · Orders  o ACO-39: Current medications updated and reviewed (, 1159F) - - 05/25/2021  · Medications  o ipratropium-albuterol 0.5 mg-3 mg(2.5 mg base)/3 mL inhalation solution for nebulization   SIG: inhale 3 milliliters by nebulization route 4 times per day and as needed, up to 6 doses per day for 30 days   DISP: (90) Milliliter with 1 refills  Prescribed on 05/25/2021     o Advair -21 mcg/actuation inhalation HFA aerosol inhaler   SIG: inhale 2 puffs by inhalation route 2 times per day in the morning and evening   DISP: (3) Inhaler with 0 refills  Prescribed on 05/25/2021     o Singulair 10 mg oral tablet   SIG: take 1 tablet (10 mg) by oral route once daily in the evening   DISP: (90) Tablet with 1 refills  Prescribed on 05/25/2021     o albuterol sulfate 90 mcg/actuation inhalation HFA aerosol inhaler   SIG: inhale 1 puff (90 mcg) by inhalation route every 6 hours as needed   DISP: (3) Inhaler  with 3 refills  Adjusted on 05/25/2021     o Medications have been Reconciled  o Transition of Care or Provider Policy  · Instructions  o Patient was educated/instructed on their diagnosis, treatment and medications prior to discharge from the clinic today.            Electronically Signed by: Lenard Coleman PA-C -Author on May 26, 2021 07:01:41 PM

## 2021-06-14 DIAGNOSIS — R26.81 UNSTEADINESS: Primary | ICD-10-CM

## 2021-06-14 DIAGNOSIS — R93.0 ABNORMAL MRI OF HEAD: ICD-10-CM

## 2021-06-14 DIAGNOSIS — G62.9 POLYNEUROPATHY: ICD-10-CM

## 2021-07-27 ENCOUNTER — TELEPHONE (OUTPATIENT)
Dept: FAMILY MEDICINE CLINIC | Facility: CLINIC | Age: 71
End: 2021-07-27

## 2021-07-27 ENCOUNTER — TELEPHONE (OUTPATIENT)
Dept: NEUROSURGERY | Facility: CLINIC | Age: 71
End: 2021-07-27

## 2021-07-27 NOTE — TELEPHONE ENCOUNTER
PATIENT HAS A CT SCHEDULED FOR Thursday AND PT IS CLAUSTROPHOBIC AND WOULD NEED SOMETHING TO TAKE TO

## 2021-07-27 NOTE — TELEPHONE ENCOUNTER
Caller: Collins Brumfield    Relationship: Self    Best call back number: [phone]    Medication needed:   Requested Prescriptions      No prescriptions requested or ordered in this encounter   VALIUM     When do you need the refill by: 7/28/21    What additional details did the patient provide when requesting the medication: PATIENT STATED THAT THIS HAS BEEN PROVIDED IN THE PAST WHEN HAVING SCANS AND IS HAVING CT DONE 7/29/21    Does the patient have less than a 3 day supply:  [x] Yes  [] No    What is the patient's preferred pharmacy: [place], [place]

## 2021-07-28 DIAGNOSIS — F41.9 ANXIETY: Primary | ICD-10-CM

## 2021-07-28 RX ORDER — DIAZEPAM 5 MG/1
10 TABLET ORAL ONCE
Qty: 1 TABLET | Refills: 0 | OUTPATIENT
Start: 2021-07-28 | End: 2021-07-28

## 2021-07-29 ENCOUNTER — HOSPITAL ENCOUNTER (OUTPATIENT)
Dept: CT IMAGING | Facility: HOSPITAL | Age: 71
Discharge: HOME | End: 2021-07-29

## 2021-07-29 ENCOUNTER — TELEPHONE (OUTPATIENT)
Dept: FAMILY MEDICINE CLINIC | Facility: CLINIC | Age: 71
End: 2021-07-29

## 2021-07-29 DIAGNOSIS — F41.9 ANXIETY: Primary | ICD-10-CM

## 2021-07-29 DIAGNOSIS — R93.0 ABNORMAL MRI OF HEAD: ICD-10-CM

## 2021-07-29 DIAGNOSIS — R26.81 UNSTEADINESS: ICD-10-CM

## 2021-07-29 DIAGNOSIS — G62.9 POLYNEUROPATHY: ICD-10-CM

## 2021-07-29 LAB — CREAT BLDA-MCNC: 1.2 MG/DL

## 2021-07-29 PROCEDURE — 70498 CT ANGIOGRAPHY NECK: CPT

## 2021-07-29 PROCEDURE — 70496 CT ANGIOGRAPHY HEAD: CPT

## 2021-07-29 PROCEDURE — 82565 ASSAY OF CREATININE: CPT

## 2021-07-29 PROCEDURE — 0 IOPAMIDOL PER 1 ML: Performed by: PSYCHIATRY & NEUROLOGY

## 2021-07-29 RX ORDER — DIAZEPAM 2 MG/1
2 TABLET ORAL 2 TIMES DAILY PRN
Qty: 1 TABLET | Refills: 0 | Status: SHIPPED | OUTPATIENT
Start: 2021-07-29 | End: 2021-08-16

## 2021-07-29 RX ADMIN — IOPAMIDOL 100 ML: 755 INJECTION, SOLUTION INTRAVENOUS at 16:03

## 2021-07-29 NOTE — TELEPHONE ENCOUNTER
Pt called stating Dr. Osuna ordered CT scan, pt is scheduled today and is requesting a valium prior to scan. Advised Pt to contact ordering dr and they stated Dr. Osuna office said PCP has to prescribe.     Please advise

## 2021-08-15 PROBLEM — G47.30 SLEEP APNEA: Status: ACTIVE | Noted: 2019-09-30

## 2021-08-15 PROBLEM — J30.9 ALLERGIC RHINITIS DUE TO ALLERGEN: Status: ACTIVE | Noted: 2020-04-30

## 2021-08-15 PROBLEM — E78.5 HYPERLIPIDEMIA: Status: ACTIVE | Noted: 2021-02-25

## 2021-08-15 PROBLEM — J45.909 ASTHMA: Status: ACTIVE | Noted: 2020-04-30

## 2021-08-15 PROBLEM — M48.061 LUMBAR SPINAL STENOSIS: Status: ACTIVE | Noted: 2021-08-15

## 2021-08-15 PROBLEM — N52.9 IMPOTENCE OF ORGANIC ORIGIN: Status: ACTIVE | Noted: 2020-08-16

## 2021-08-15 PROBLEM — G47.00 INSOMNIA, UNSPECIFIED: Status: ACTIVE | Noted: 2019-07-26

## 2021-08-15 RX ORDER — IBUPROFEN 600 MG/1
TABLET ORAL
COMMUNITY

## 2021-08-15 RX ORDER — ASPIRIN 81 MG/1
TABLET ORAL
COMMUNITY
End: 2021-08-16

## 2021-08-15 RX ORDER — ZOLPIDEM TARTRATE 10 MG/1
TABLET ORAL
COMMUNITY
End: 2021-08-16 | Stop reason: SDUPTHER

## 2021-08-16 ENCOUNTER — OFFICE VISIT (OUTPATIENT)
Dept: FAMILY MEDICINE CLINIC | Facility: CLINIC | Age: 71
End: 2021-08-16

## 2021-08-16 VITALS
WEIGHT: 251.4 LBS | SYSTOLIC BLOOD PRESSURE: 131 MMHG | BODY MASS INDEX: 34.05 KG/M2 | HEIGHT: 72 IN | DIASTOLIC BLOOD PRESSURE: 73 MMHG | HEART RATE: 88 BPM | OXYGEN SATURATION: 95 %

## 2021-08-16 DIAGNOSIS — G47.00 INSOMNIA, UNSPECIFIED TYPE: Primary | Chronic | ICD-10-CM

## 2021-08-16 DIAGNOSIS — E78.00 PURE HYPERCHOLESTEROLEMIA: ICD-10-CM

## 2021-08-16 DIAGNOSIS — J45.20 MILD INTERMITTENT ASTHMA, UNSPECIFIED WHETHER COMPLICATED: Chronic | ICD-10-CM

## 2021-08-16 PROCEDURE — 99214 OFFICE O/P EST MOD 30 MIN: CPT | Performed by: PHYSICIAN ASSISTANT

## 2021-08-16 RX ORDER — ALBUTEROL SULFATE 90 UG/1
2 AEROSOL, METERED RESPIRATORY (INHALATION) EVERY 4 HOURS PRN
COMMUNITY
End: 2021-08-16 | Stop reason: SDUPTHER

## 2021-08-16 RX ORDER — ZOLPIDEM TARTRATE 10 MG/1
10 TABLET ORAL NIGHTLY PRN
Qty: 30 TABLET | Refills: 2 | Status: SHIPPED | OUTPATIENT
Start: 2021-08-16 | End: 2023-02-08 | Stop reason: SDUPTHER

## 2021-08-16 RX ORDER — IPRATROPIUM BROMIDE AND ALBUTEROL SULFATE 2.5; .5 MG/3ML; MG/3ML
3 SOLUTION RESPIRATORY (INHALATION) EVERY 4 HOURS PRN
COMMUNITY

## 2021-08-16 RX ORDER — MONTELUKAST SODIUM 10 MG/1
10 TABLET ORAL NIGHTLY
COMMUNITY

## 2021-08-16 RX ORDER — ALBUTEROL SULFATE 90 UG/1
2 AEROSOL, METERED RESPIRATORY (INHALATION) EVERY 4 HOURS PRN
Qty: 3 G | Refills: 3 | Status: SHIPPED | OUTPATIENT
Start: 2021-08-16

## 2021-08-16 NOTE — PROGRESS NOTES
"Chief Complaint  Asthma (6 month follow up) and Hyperlipidemia    Subjective          Collins Brumfield presents to [facility]  History of Present Illness  Pt presents today for 6 month follow up.    Pt states no new issues or concerns to discuss today.    Neuro-Oropilla f/u 10/26/21    Pt has not had any covid vaccines.  Labs 3/4/21  A1C 5.6    No CP, SOA, HA    Objective   Vital Signs:   /73 (BP Location: Left arm)   Pulse 88   Ht 182.9 cm (72\")   Wt 114 kg (251 lb 6.4 oz)   SpO2 95%   BMI 34.10 kg/m²     Physical Exam  Vitals and nursing note reviewed.   Constitutional:       Appearance: Normal appearance. He is obese.   HENT:      Head: Normocephalic and atraumatic.   Cardiovascular:      Rate and Rhythm: Normal rate and regular rhythm.   Pulmonary:      Effort: Pulmonary effort is normal.      Breath sounds: Normal breath sounds.   Musculoskeletal:      Cervical back: Neck supple.   Neurological:      Mental Status: He is alert.   Psychiatric:         Mood and Affect: Mood normal.         Behavior: Behavior normal.        Result Review :     Common labs    Common Labsle 2/24/21 3/4/21 3/4/21 7/29/21     1017 1017    Glucose 83      BUN 12      Creatinine 1.11   1.20   Sodium 141      Potassium 4.1      Chloride 107      Calcium 8.9      Albumin 4.2  3.9    Total Bilirubin 0.51      Alkaline Phosphatase 71      AST (SGOT) 20      ALT (SGPT) 17      WBC 7.55      Hemoglobin 15.0      Hematocrit 46.4      Platelets 184      Total Cholesterol 227 (A)      Triglycerides 135      HDL Cholesterol 38 (A)      LDL Cholesterol  162 (A)      Hemoglobin A1C  5.6     PSA 1.12      (A) Abnormal value       Comments are available for some flowsheets but are not being displayed.                  CT Angiogram Head (07/29/2021 16:04)  CT Angiogram Neck (07/29/2021 16:03)         Assessment and Plan      Diagnoses and all orders for this visit:    1. Insomnia, unspecified type " (Primary)  Comments:  Controlled with PRN use of ambien  Orders:  -     zolpidem (Ambien) 10 MG tablet; Take 1 tablet by mouth At Night As Needed for Sleep.  Dispense: 30 tablet; Refill: 2    2. Mild intermittent asthma, unspecified whether complicated  Comments:  Controlled with advair and albuterol HFA  Orders:  -     albuterol sulfate  (90 Base) MCG/ACT inhaler; Inhale 2 puffs Every 4 (Four) Hours As Needed for Wheezing.  Dispense: 3 g; Refill: 3  -     fluticasone-salmeterol (ADVAIR HFA) 115-21 MCG/ACT inhaler; Inhale 2 puffs 2 (Two) Times a Day.  Dispense: 3 each; Refill: 3    3. Pure hypercholesterolemia  Overview:  Stable with diet and exercise         Follow Up     Return in about 6 months (around 2/16/2022).      I have reviewed information obtained and documented by others and I have confirmed the accuracy of this documented note.     Patient was given instructions and counseling regarding his condition or for health maintenance advice. Please see specific information pulled into the AVS if appropriate.     MICKEY Kraft

## 2021-10-26 ENCOUNTER — OFFICE VISIT (OUTPATIENT)
Dept: NEUROLOGY | Facility: CLINIC | Age: 71
End: 2021-10-26

## 2021-10-26 VITALS
BODY MASS INDEX: 34.81 KG/M2 | WEIGHT: 257 LBS | HEIGHT: 72 IN | SYSTOLIC BLOOD PRESSURE: 132 MMHG | HEART RATE: 76 BPM | DIASTOLIC BLOOD PRESSURE: 70 MMHG

## 2021-10-26 DIAGNOSIS — G62.9 POLYNEUROPATHY: Primary | ICD-10-CM

## 2021-10-26 PROCEDURE — 99214 OFFICE O/P EST MOD 30 MIN: CPT | Performed by: PSYCHIATRY & NEUROLOGY

## 2021-10-26 NOTE — PROGRESS NOTES
"Chief Complaint  Neurologic Problem    Subjective          Collins Brumfield is a 71 y.o. male who presents to [facility]  History of Present Illness  71-year-old man here for follow-up of his gait abnormalities.  He has not fallen down recently.  He states that sometimes he feels unsteady.  He does not have any start hesitation.  He has no problems stopping when he is walking.  He does not feel like his gait is going faster.  He is not using a cane.  Work-up had been negative.  CT angiogram of the head and neck were unremarkable.  MRI of the brain shows white matter changes.  He does not have diabetes.  Neuropathy work-up was unremarkable.    Objective   Vital Signs:   /70   Pulse 76   Ht 182.9 cm (72.01\")   Wt 117 kg (257 lb)   BMI 34.85 kg/m²     Physical Exam   He is alert, fluent, phasic, follows commands well he is able to tiptoe, heel walk, charito and has difficulty with tandem dosing balance after 3 steps.        Assessment and Plan  Diagnoses and all orders for this visit:    1. Polyneuropathy (Primary)  Assessment & Plan:  I discussed with him and his wife that his gait abnormality is most likely combination of polyneuropathy as well as the white matter changes on MRI.  I discussed with him that he is not diabetic at this time but he needs to watch his diet and I would recommend for him to talk to his primary care to try to lose weight so that he does not develop diabetes.  I discussed with him and his wife regarding doing a DaTscan and they will asked her daughter is a nurse whether or not she would like the study to be ordered.  Clinically he does not have Parkinson's disease.  He does not have Parkinson syndrome.  I will see him again in the future as needed.         Total time spent with the patient and coordinating patient care was 35 minutes.    Follow Up  No follow-ups on file.  Patient was given instructions and counseling regarding his condition or " for health maintenance advice. Please see specific information pulled into the AVS if appropriate.

## 2021-10-26 NOTE — ASSESSMENT & PLAN NOTE
I discussed with him and his wife that his gait abnormality is most likely combination of polyneuropathy as well as the white matter changes on MRI.  I discussed with him that he is not diabetic at this time but he needs to watch his diet and I would recommend for him to talk to his primary care to try to lose weight so that he does not develop diabetes.  I discussed with him and his wife regarding doing a DaTscan and they will asked her daughter is a nurse whether or not she would like the study to be ordered.  Clinically he does not have Parkinson's disease.  He does not have Parkinson syndrome.  I will see him again in the future as needed.

## 2021-12-07 ENCOUNTER — OFFICE VISIT (OUTPATIENT)
Dept: FAMILY MEDICINE CLINIC | Facility: CLINIC | Age: 71
End: 2021-12-07

## 2021-12-07 DIAGNOSIS — Z00.00 MEDICARE ANNUAL WELLNESS VISIT, SUBSEQUENT: Primary | ICD-10-CM

## 2021-12-07 PROCEDURE — 1126F AMNT PAIN NOTED NONE PRSNT: CPT | Performed by: PHYSICIAN ASSISTANT

## 2021-12-07 PROCEDURE — 1159F MED LIST DOCD IN RCRD: CPT | Performed by: PHYSICIAN ASSISTANT

## 2021-12-07 PROCEDURE — 1170F FXNL STATUS ASSESSED: CPT | Performed by: PHYSICIAN ASSISTANT

## 2021-12-07 PROCEDURE — G0439 PPPS, SUBSEQ VISIT: HCPCS | Performed by: PHYSICIAN ASSISTANT

## 2021-12-07 NOTE — PROGRESS NOTES
You have chosen to receive care through a telehealth visit.  Do you consent to use a video/audio connection for your medical care today? Yes  The ABCs of the Annual Wellness Visit  Subsequent Medicare Wellness Visit    Chief Complaint   Patient presents with   • Medicare Wellness-subsequent     AWV      Subjective    History of Present Illness:  Collins Brumfield is a 71 y.o. male who presents for a Subsequent Medicare Wellness Visit.    The following portions of the patient's history were reviewed and   updated as appropriate: allergies, current medications, past family history, past medical history, past social history, past surgical history and problem list.    Compared to one year ago, the patient feels his physical   health is worse.    Compared to one year ago, the patient feels his mental   health is better.    Recent Hospitalizations:  He was not admitted to the hospital during the last year.       Current Medical Providers:  Patient Care Team:  Ankit Coleman PA as PCP - General (Physician Assistant)    Outpatient Medications Prior to Visit   Medication Sig Dispense Refill   • albuterol sulfate  (90 Base) MCG/ACT inhaler Inhale 2 puffs Every 4 (Four) Hours As Needed for Wheezing. 3 g 3   • fluticasone-salmeterol (ADVAIR HFA) 115-21 MCG/ACT inhaler Inhale 2 puffs 2 (Two) Times a Day. 3 each 3   • ibuprofen (ADVIL,MOTRIN) 600 MG tablet ibuprofen 600 mg oral tablet take 2 tablets by oral route 3 times a day   Suspended     • ipratropium-albuterol (DUO-NEB) 0.5-2.5 mg/3 ml nebulizer Take 3 mL by nebulization Every 4 (Four) Hours As Needed for Wheezing.     • montelukast (SINGULAIR) 10 MG tablet Take 10 mg by mouth Every Night.     • zolpidem (Ambien) 10 MG tablet Take 1 tablet by mouth At Night As Needed for Sleep. 30 tablet 2     No facility-administered medications prior to visit.       No opioid medication identified on active medication list. I have reviewed chart for other potential  high risk  medication/s and harmful drug interactions in the elderly.          Aspirin is not on active medication list.  pt does not take aspirin.    Patient Active Problem List   Diagnosis   • Allergic rhinitis due to allergen   • Hyperlipidemia   • Impotence of organic origin   • Insomnia, unspecified   • Lumbar spinal stenosis   • Asthma   • Sleep apnea   • Polyneuropathy     Advance Care Planning  Advance Directive is not on file.  ACP discussion was held with the patient during this visit. Patient does not have an advance directive, information provided.          Objective    Vitals:    12/07/21 1533   PainSc: 0-No pain     BMI Readings from Last 1 Encounters:   10/26/21 34.85 kg/m²   BMI is above normal parameters. Recommendations include: exercise counseling    Does the patient have evidence of cognitive impairment? No              HEALTH RISK ASSESSMENT    Smoking Status:  Social History     Tobacco Use   Smoking Status Never Smoker   Smokeless Tobacco Never Used   Tobacco Comment    11/12/2020     Alcohol Consumption:  Social History     Substance and Sexual Activity   Alcohol Use Yes    Comment: 11/12/2020-12/6/2019-10/28/2019- rarely drinks, less than 1 drink per day     Fall Risk Screen:    TAYLORADI Fall Risk Assessment was completed, and patient is at HIGH risk for falls. Assessment completed on:12/7/2021    Depression Screening:  PHQ-2/PHQ-9 Depression Screening 12/7/2021   Little interest or pleasure in doing things 0   Feeling down, depressed, or hopeless 0   Total Score 0       Health Habits and Functional and Cognitive Screening:  Functional & Cognitive Status 12/7/2021   Do you have difficulty preparing food and eating? No   Do you have difficulty bathing yourself, getting dressed or grooming yourself? No   Do you have difficulty using the toilet? No   Do you have difficulty moving around from place to place? No   Do you have trouble with steps or getting out of a bed or a chair? No   Current Diet Well  Balanced Diet   Dental Exam Up to date   Eye Exam Up to date   Exercise (times per week) 2 times per week   Current Exercises Include Walking   Do you need help using the phone?  No   Are you deaf or do you have serious difficulty hearing?  No   Do you need help with transportation? No   Do you need help shopping? No   Do you need help preparing meals?  No   Do you need help with housework?  No   Do you need help with laundry? No   Do you need help taking your medications? No   Do you need help managing money? No   Do you ever drive or ride in a car without wearing a seat belt? No   Have you felt unusual stress, anger or loneliness in the last month? No   Who do you live with? Spouse   If you need help, do you have trouble finding someone available to you? No   Have you been bothered in the last four weeks by sexual problems? No   Do you have difficulty concentrating, remembering or making decisions? No       Age-appropriate Screening Schedule:  Refer to the list below for future screening recommendations based on patient's age, sex and/or medical conditions. Orders for these recommended tests are listed in the plan section. The patient has been provided with a written plan.    Health Maintenance   Topic Date Due   • ZOSTER VACCINE (1 of 2) Never done   • INFLUENZA VACCINE  Never done   • LIPID PANEL  02/24/2022   • TDAP/TD VACCINES (2 - Td or Tdap) 04/22/2029              Assessment/Plan   CMS Preventative Services Quick Reference  Risk Factors Identified During Encounter  Obesity/Overweight   The above risks/problems have been discussed with the patient.  Follow up actions/plans if indicated are seen below in the Assessment/Plan Section.  Pertinent information has been shared with the patient in the After Visit Summary.    Diagnoses and all orders for this visit:    1. Medicare annual wellness visit, subsequent (Primary)        Follow Up:   Return in about 6 months (around 6/7/2022).     An After Visit Summary and  PPPS were made available to the patient.            I have reviewed information obtained and documented by others and I have confirmed the accuracy of this documented note.    MICKEY Kraft

## 2022-01-21 PROCEDURE — U0004 COV-19 TEST NON-CDC HGH THRU: HCPCS | Performed by: FAMILY MEDICINE

## 2022-01-21 PROCEDURE — U0005 INFEC AGEN DETEC AMPLI PROBE: HCPCS | Performed by: FAMILY MEDICINE

## 2022-01-26 ENCOUNTER — TELEPHONE (OUTPATIENT)
Dept: URGENT CARE | Facility: CLINIC | Age: 72
End: 2022-01-26

## 2022-01-26 DIAGNOSIS — U07.1 COVID-19: Primary | ICD-10-CM

## 2022-02-17 ENCOUNTER — TELEPHONE (OUTPATIENT)
Dept: FAMILY MEDICINE CLINIC | Facility: CLINIC | Age: 72
End: 2022-02-17

## 2022-02-17 DIAGNOSIS — N52.9 MALE ERECTILE DISORDER: Primary | ICD-10-CM

## 2022-02-17 RX ORDER — TADALAFIL 20 MG/1
20 TABLET ORAL DAILY PRN
Qty: 90 TABLET | Refills: 0 | Status: SHIPPED | OUTPATIENT
Start: 2022-02-17 | End: 2022-02-18 | Stop reason: SDUPTHER

## 2022-02-17 NOTE — TELEPHONE ENCOUNTER
----- Message from Collins Brumfield sent at 2022  3:49 PM EST -----  Regarding: Prescription Refill  Prescription has . Please send new prescription to [facility].    TADALAFIL 20MG 90 Day Supply.    Collins Brumfield

## 2022-02-18 ENCOUNTER — TELEPHONE (OUTPATIENT)
Dept: FAMILY MEDICINE CLINIC | Facility: CLINIC | Age: 72
End: 2022-02-18

## 2022-02-18 DIAGNOSIS — G89.29 CHRONIC LEFT SHOULDER PAIN: Primary | ICD-10-CM

## 2022-02-18 DIAGNOSIS — M25.512 CHRONIC LEFT SHOULDER PAIN: Primary | ICD-10-CM

## 2022-02-18 DIAGNOSIS — N52.9 MALE ERECTILE DISORDER: ICD-10-CM

## 2022-02-18 RX ORDER — TADALAFIL 20 MG/1
20 TABLET ORAL DAILY PRN
Qty: 90 TABLET | Refills: 0 | Status: SHIPPED | OUTPATIENT
Start: 2022-02-18

## 2022-02-18 NOTE — TELEPHONE ENCOUNTER
----- Message from Collins Brumfield sent at 2/18/2022  3:23 PM EST -----  Regarding: Prescription Refill  Please send this prescription to [facility]. Sorry for any inconvenience.  Collins

## 2022-03-15 ENCOUNTER — OFFICE VISIT (OUTPATIENT)
Dept: FAMILY MEDICINE CLINIC | Facility: CLINIC | Age: 72
End: 2022-03-15

## 2022-03-15 VITALS
HEART RATE: 74 BPM | SYSTOLIC BLOOD PRESSURE: 119 MMHG | WEIGHT: 257 LBS | DIASTOLIC BLOOD PRESSURE: 63 MMHG | OXYGEN SATURATION: 94 % | HEIGHT: 72 IN | BODY MASS INDEX: 34.81 KG/M2

## 2022-03-15 DIAGNOSIS — E78.00 PURE HYPERCHOLESTEROLEMIA: ICD-10-CM

## 2022-03-15 DIAGNOSIS — M25.561 CHRONIC PAIN OF BOTH KNEES: Primary | ICD-10-CM

## 2022-03-15 DIAGNOSIS — G89.29 CHRONIC PAIN OF BOTH KNEES: Primary | ICD-10-CM

## 2022-03-15 DIAGNOSIS — M25.562 CHRONIC PAIN OF BOTH KNEES: Primary | ICD-10-CM

## 2022-03-15 DIAGNOSIS — G47.33 OSA ON CPAP: ICD-10-CM

## 2022-03-15 DIAGNOSIS — E55.9 VITAMIN D DEFICIENCY: ICD-10-CM

## 2022-03-15 DIAGNOSIS — Z12.5 SCREENING FOR PROSTATE CANCER: ICD-10-CM

## 2022-03-15 DIAGNOSIS — E66.09 CLASS 1 OBESITY DUE TO EXCESS CALORIES WITH SERIOUS COMORBIDITY AND BODY MASS INDEX (BMI) OF 34.0 TO 34.9 IN ADULT: Chronic | ICD-10-CM

## 2022-03-15 DIAGNOSIS — R73.9 HYPERGLYCEMIA: ICD-10-CM

## 2022-03-15 DIAGNOSIS — Z99.89 OSA ON CPAP: ICD-10-CM

## 2022-03-15 PROCEDURE — 99213 OFFICE O/P EST LOW 20 MIN: CPT | Performed by: PHYSICIAN ASSISTANT

## 2022-03-15 NOTE — PROGRESS NOTES
Chief Complaint  Hyperlipidemia (6 month follow up) and Insomnia    Subjective          Collins AUGUSTIN Brumfield presents to [facility]  History of Present Illness  Pt presents today for 6 month follow up.    Pt states he has b/lateral knee pain. Pt states he has issues getting up and down also going up and down stairs.    Pt states he would like to discuss Inspire device for cpap. With ENT    Pt states he tore his left shoulder rotator cuff in Feb. Pt states he has been doing pt 2xweek.  Pt states seems to be helping some.    Labs 3/4/21  A1C 5.6     Past Medical History:   Diagnosis Date   • Aftercare following surgery 11/04/2020   • Allergic rhinitis due to allergen 04/30/2020   • Asthma 04/30/2020   • Erectile dysfunction 08/16/2020   • Foot fracture, right    • Heel pain    • Hyperlipidemia 02/25/2021   • Ingrown toenail    • Insomnia, unspecified 07/06/2019   • Lumbago 06/02/2016    low back pain   • Lumbar spinal stenosis    • Sleep apnea 09/30/2019   • Surgery follow-up 10/07/2020   • Synovial cyst     unspecified      Family History   Problem Relation Age of Onset   • Cancer Father    • Cancer Brother    • Cancer Other         family history of cancer      Past Surgical History:   Procedure Laterality Date   • BACK SURGERY     • BACK SURGERY      lumbar    8 years?   • COLONOSCOPY  2018    normal   • HAND SURGERY     • LUMBAR LAMINECTOMY  05/16/2016    L2-3   • TONSILLECTOMY          Current Outpatient Medications:   •  albuterol sulfate  (90 Base) MCG/ACT inhaler, Inhale 2 puffs Every 4 (Four) Hours As Needed for Wheezing., Disp: 3 g, Rfl: 3  •  fluticasone-salmeterol (ADVAIR HFA) 115-21 MCG/ACT inhaler, Inhale 2 puffs 2 (Two) Times a Day., Disp: 3 each, Rfl: 3  •  ibuprofen (ADVIL,MOTRIN) 600 MG tablet, ibuprofen 600 mg oral tablet take 2 tablets by oral route 3 times a day   Suspended, Disp: , Rfl:   •  ipratropium-albuterol (DUO-NEB) 0.5-2.5 mg/3 ml nebulizer, Take 3 mL  "by nebulization Every 4 (Four) Hours As Needed for Wheezing., Disp: , Rfl:   •  montelukast (SINGULAIR) 10 MG tablet, Take 10 mg by mouth Every Night., Disp: , Rfl:   •  tadalafil (Cialis) 20 MG tablet, Take 1 tablet by mouth Daily As Needed for Erectile Dysfunction., Disp: 90 tablet, Rfl: 0  •  zolpidem (Ambien) 10 MG tablet, Take 1 tablet by mouth At Night As Needed for Sleep., Disp: 30 tablet, Rfl: 2    Objective     Vital Signs:     /63 (BP Location: Right arm)   Pulse 74   Ht 182.9 cm (72.01\")   Wt 117 kg (257 lb)   SpO2 94%   BMI 34.85 kg/m²    Estimated body mass index is 34.85 kg/m² as calculated from the following:    Height as of this encounter: 182.9 cm (72.01\").    Weight as of this encounter: 117 kg (257 lb).     Wt Readings from Last 3 Encounters:   03/15/22 117 kg (257 lb)   01/21/22 113 kg (250 lb)   10/26/21 117 kg (257 lb)     BP Readings from Last 3 Encounters:   03/15/22 119/63   01/21/22 133/80   10/26/21 132/70     Physical Exam  Vitals and nursing note reviewed.   Constitutional:       Appearance: Normal appearance. He is obese.   HENT:      Head: Normocephalic and atraumatic.   Cardiovascular:      Rate and Rhythm: Normal rate and regular rhythm.   Pulmonary:      Effort: Pulmonary effort is normal.      Breath sounds: Normal breath sounds.   Musculoskeletal:      Cervical back: Neck supple.      Comments: Bilat Knee - occ crepitus bilat, full ROM, no edema   Neurological:      Mental Status: He is alert.   Psychiatric:         Mood and Affect: Mood normal.         Behavior: Behavior normal.        Result Review :     Common labs    Common Labsle 7/29/21   Creatinine 1.20      Comments are available for some flowsheets but are not being displayed.                         Assessment and Plan      Diagnoses and all orders for this visit:    1. Chronic pain of both knees (Primary)  -     Ambulatory Referral to Physical Therapy Evaluate and treat  -     CBC & Differential; Future    2. " Class 1 obesity due to excess calories with serious comorbidity and body mass index (BMI) of 34.0 to 34.9 in adult  Comments:  Disc diet and exercise    3. MARIO ALBERTO on CPAP  -     Ambulatory Referral to ENT (Otolaryngology)    4. Screening for prostate cancer  -     PSA Screen; Future    5. Pure hypercholesterolemia  Overview:  Stable with diet and exercise    Orders:  -     CBC & Differential; Future  -     Comprehensive Metabolic Panel; Future  -     Lipid Panel; Future  -     TSH Rfx On Abnormal To Free T4; Future  -     Urinalysis With Microscopic If Indicated (No Culture) - Urine, Clean Catch; Future    6. Vitamin D deficiency  -     Vitamin D 25 Hydroxy; Future    7. Hyperglycemia  -     Comprehensive Metabolic Panel; Future  -     TSH Rfx On Abnormal To Free T4; Future  -     Hemoglobin A1c; Future     ENT consult in [facility] to discuss Inspire implant for MARIO ALBERTO    Follow Up     Return in about 6 months (around 9/15/2022).    Patient was given instructions and counseling regarding his condition or for health maintenance advice. Please see specific information pulled into the AVS if appropriate.     I have reviewed information obtained and documented by others and I have confirmed the accuracy of this documented note.    MICKEY Kraft

## 2022-03-17 ENCOUNTER — LAB (OUTPATIENT)
Dept: LAB | Facility: HOSPITAL | Age: 72
End: 2022-03-17

## 2022-03-17 DIAGNOSIS — G89.29 CHRONIC PAIN OF BOTH KNEES: ICD-10-CM

## 2022-03-17 DIAGNOSIS — M25.562 CHRONIC PAIN OF BOTH KNEES: ICD-10-CM

## 2022-03-17 DIAGNOSIS — Z12.5 SCREENING FOR PROSTATE CANCER: ICD-10-CM

## 2022-03-17 DIAGNOSIS — R73.9 HYPERGLYCEMIA: ICD-10-CM

## 2022-03-17 DIAGNOSIS — E78.00 PURE HYPERCHOLESTEROLEMIA: ICD-10-CM

## 2022-03-17 DIAGNOSIS — M25.561 CHRONIC PAIN OF BOTH KNEES: ICD-10-CM

## 2022-03-17 DIAGNOSIS — E55.9 VITAMIN D DEFICIENCY: ICD-10-CM

## 2022-03-17 LAB
25(OH)D3 SERPL-MCNC: 17.9 NG/ML (ref 30–100)
ALBUMIN SERPL-MCNC: 4.6 G/DL (ref 3.5–5.2)
ALBUMIN/GLOB SERPL: 1.7 G/DL
ALP SERPL-CCNC: 64 U/L (ref 39–117)
ALT SERPL W P-5'-P-CCNC: 25 U/L (ref 1–41)
ANION GAP SERPL CALCULATED.3IONS-SCNC: 12 MMOL/L (ref 5–15)
AST SERPL-CCNC: 23 U/L (ref 1–40)
BASOPHILS # BLD AUTO: 0.06 10*3/MM3 (ref 0–0.2)
BASOPHILS NFR BLD AUTO: 0.9 % (ref 0–1.5)
BILIRUB SERPL-MCNC: 0.6 MG/DL (ref 0–1.2)
BILIRUB UR QL STRIP: NEGATIVE
BUN SERPL-MCNC: 9 MG/DL (ref 8–23)
BUN/CREAT SERPL: 7.1 (ref 7–25)
CALCIUM SPEC-SCNC: 9.7 MG/DL (ref 8.6–10.5)
CHLORIDE SERPL-SCNC: 106 MMOL/L (ref 98–107)
CHOLEST SERPL-MCNC: 222 MG/DL (ref 0–200)
CLARITY UR: CLEAR
CO2 SERPL-SCNC: 24 MMOL/L (ref 22–29)
COLOR UR: YELLOW
CREAT SERPL-MCNC: 1.27 MG/DL (ref 0.76–1.27)
DEPRECATED RDW RBC AUTO: 43.4 FL (ref 37–54)
EGFRCR SERPLBLD CKD-EPI 2021: 61.2 ML/MIN/1.73
EOSINOPHIL # BLD AUTO: 0.29 10*3/MM3 (ref 0–0.4)
EOSINOPHIL NFR BLD AUTO: 4.3 % (ref 0.3–6.2)
ERYTHROCYTE [DISTWIDTH] IN BLOOD BY AUTOMATED COUNT: 12.9 % (ref 12.3–15.4)
GLOBULIN UR ELPH-MCNC: 2.7 GM/DL
GLUCOSE SERPL-MCNC: 95 MG/DL (ref 65–99)
GLUCOSE UR STRIP-MCNC: NEGATIVE MG/DL
HBA1C MFR BLD: 6.1 % (ref 4.8–5.6)
HCT VFR BLD AUTO: 47 % (ref 37.5–51)
HDLC SERPL-MCNC: 36 MG/DL (ref 40–60)
HGB BLD-MCNC: 15.7 G/DL (ref 13–17.7)
HGB UR QL STRIP.AUTO: NEGATIVE
IMM GRANULOCYTES # BLD AUTO: 0.06 10*3/MM3 (ref 0–0.05)
IMM GRANULOCYTES NFR BLD AUTO: 0.9 % (ref 0–0.5)
KETONES UR QL STRIP: NEGATIVE
LDLC SERPL CALC-MCNC: 156 MG/DL (ref 0–100)
LDLC/HDLC SERPL: 4.26 {RATIO}
LEUKOCYTE ESTERASE UR QL STRIP.AUTO: NEGATIVE
LYMPHOCYTES # BLD AUTO: 2.03 10*3/MM3 (ref 0.7–3.1)
LYMPHOCYTES NFR BLD AUTO: 30.3 % (ref 19.6–45.3)
MCH RBC QN AUTO: 30.7 PG (ref 26.6–33)
MCHC RBC AUTO-ENTMCNC: 33.4 G/DL (ref 31.5–35.7)
MCV RBC AUTO: 91.8 FL (ref 79–97)
MONOCYTES # BLD AUTO: 0.56 10*3/MM3 (ref 0.1–0.9)
MONOCYTES NFR BLD AUTO: 8.4 % (ref 5–12)
NEUTROPHILS NFR BLD AUTO: 3.7 10*3/MM3 (ref 1.7–7)
NEUTROPHILS NFR BLD AUTO: 55.2 % (ref 42.7–76)
NITRITE UR QL STRIP: NEGATIVE
NRBC BLD AUTO-RTO: 0 /100 WBC (ref 0–0.2)
PH UR STRIP.AUTO: 7 [PH] (ref 5–8)
PLATELET # BLD AUTO: 215 10*3/MM3 (ref 140–450)
PMV BLD AUTO: 10.6 FL (ref 6–12)
POTASSIUM SERPL-SCNC: 4.2 MMOL/L (ref 3.5–5.2)
PROT SERPL-MCNC: 7.3 G/DL (ref 6–8.5)
PROT UR QL STRIP: NEGATIVE
PSA SERPL-MCNC: 1.19 NG/ML (ref 0–4)
RBC # BLD AUTO: 5.12 10*6/MM3 (ref 4.14–5.8)
SODIUM SERPL-SCNC: 142 MMOL/L (ref 136–145)
SP GR UR STRIP: 1.02 (ref 1–1.03)
TRIGL SERPL-MCNC: 164 MG/DL (ref 0–150)
TSH SERPL DL<=0.05 MIU/L-ACNC: 2.31 UIU/ML (ref 0.27–4.2)
UROBILINOGEN UR QL STRIP: NORMAL
VLDLC SERPL-MCNC: 30 MG/DL (ref 5–40)
WBC NRBC COR # BLD: 6.7 10*3/MM3 (ref 3.4–10.8)

## 2022-03-17 PROCEDURE — 80053 COMPREHEN METABOLIC PANEL: CPT

## 2022-03-17 PROCEDURE — 83036 HEMOGLOBIN GLYCOSYLATED A1C: CPT

## 2022-03-17 PROCEDURE — 80061 LIPID PANEL: CPT

## 2022-03-17 PROCEDURE — 85025 COMPLETE CBC W/AUTO DIFF WBC: CPT

## 2022-03-17 PROCEDURE — 36415 COLL VENOUS BLD VENIPUNCTURE: CPT

## 2022-03-17 PROCEDURE — 81003 URINALYSIS AUTO W/O SCOPE: CPT

## 2022-03-17 PROCEDURE — 84443 ASSAY THYROID STIM HORMONE: CPT

## 2022-03-17 PROCEDURE — G0103 PSA SCREENING: HCPCS

## 2022-03-17 PROCEDURE — 82306 VITAMIN D 25 HYDROXY: CPT

## 2022-03-18 ENCOUNTER — TELEPHONE (OUTPATIENT)
Dept: FAMILY MEDICINE CLINIC | Facility: CLINIC | Age: 72
End: 2022-03-18

## 2022-03-18 DIAGNOSIS — E55.9 VITAMIN D DEFICIENCY: Primary | ICD-10-CM

## 2022-03-18 RX ORDER — ERGOCALCIFEROL 1.25 MG/1
50000 CAPSULE ORAL WEEKLY
Qty: 13 CAPSULE | Refills: 1 | Status: SHIPPED | OUTPATIENT
Start: 2022-03-18 | End: 2022-07-06 | Stop reason: SDUPTHER

## 2022-03-18 NOTE — TELEPHONE ENCOUNTER
----- Message from MICKEY Kraft sent at 3/17/2022  9:54 PM EDT -----  Vitamin D Deficiency noted - begin Vitamin D 50,000 IU weekly #[id number]    Developing DM - decrease wt, increase exercise    Poor control of Lipids - low chol diet and exercise, decrease wt

## 2022-03-29 ENCOUNTER — OFFICE VISIT (OUTPATIENT)
Dept: FAMILY MEDICINE CLINIC | Facility: CLINIC | Age: 72
End: 2022-03-29

## 2022-03-29 ENCOUNTER — TELEPHONE (OUTPATIENT)
Dept: FAMILY MEDICINE CLINIC | Facility: CLINIC | Age: 72
End: 2022-03-29

## 2022-03-29 VITALS
WEIGHT: 248 LBS | SYSTOLIC BLOOD PRESSURE: 134 MMHG | BODY MASS INDEX: 33.59 KG/M2 | DIASTOLIC BLOOD PRESSURE: 75 MMHG | HEART RATE: 72 BPM | HEIGHT: 72 IN | OXYGEN SATURATION: 98 %

## 2022-03-29 DIAGNOSIS — J30.1 SEASONAL ALLERGIC RHINITIS DUE TO POLLEN: Primary | ICD-10-CM

## 2022-03-29 PROCEDURE — 99213 OFFICE O/P EST LOW 20 MIN: CPT

## 2022-03-29 RX ORDER — FLUTICASONE PROPIONATE 50 MCG
2 SPRAY, SUSPENSION (ML) NASAL DAILY
Qty: 16 G | Refills: 1 | Status: SHIPPED | OUTPATIENT
Start: 2022-03-29

## 2022-03-29 RX ORDER — GUAIFENESIN 600 MG/1
1200 TABLET, EXTENDED RELEASE ORAL 2 TIMES DAILY
Qty: 14 TABLET | Refills: 0 | Status: SHIPPED | OUTPATIENT
Start: 2022-03-29

## 2022-03-29 NOTE — TELEPHONE ENCOUNTER
Caller: Collins Brumfield    Relationship: Self    Best call back number: [phone]    What medication are you requesting: PCP RECOMMENDATION    What are your current symptoms: SINUS INFECTION, COUGH WITH GREEN MUCUS, CONGESTION    How long have you been experiencing symptoms: A WEEK    Have you had these symptoms before:    [x] Yes  [] No    Have you been treated for these symptoms before:   [x] Yes  [] No    If a prescription is needed, what is your preferred pharmacy and phone number: [facility] [place] [facility] -  [place], [place] - [address] [phone] [facility] [phone]      Additional notes:  THE PATIENT STATED HE HAS BEEN HAVING A SINUS INFECTION WITH A BAD COUGH FOR A WEEK NOW AND WOULD LIKE PCP TO CALL HIM BACK AND DISCUSS MEDICATION BEING SENT TO THE PHARMACY.

## 2022-03-29 NOTE — PROGRESS NOTES
"Chief Complaint  Cough (Congestion x3days, would like printed RX if something is sent in.)    Subjective          Collins AUGUSTIN Brumfield presents to [facility]  History of Present Illness    Mr Brumfield thinks he is having a flare of his allergies. He reports significant congestion and coughing (especially while laying down). He has been using his albuterol and Advair inhalers as prescribed. He denies body aches or headache - states he had a possible fever the other night but didn't check his temperature. He denies sick contact. Vaccinated for COVID, not vaccinated for flu. He reports taking Singulair daily for allergy symptoms but isn't sure its helping.     Objective   Vital Signs:   /75   Pulse 72   Ht 182.9 cm (72\")   Wt 112 kg (248 lb)   SpO2 98%   BMI 33.63 kg/m²       Physical Exam  Vitals reviewed.   Constitutional:       Appearance: Normal appearance. He is well-developed.   HENT:      Head: Normocephalic and atraumatic.      Right Ear: Tympanic membrane, ear canal and external ear normal.      Left Ear: Tympanic membrane, ear canal and external ear normal.      Nose: Congestion present.      Mouth/Throat:      Mouth: Mucous membranes are moist.      Pharynx: Oropharynx is clear.   Eyes:      Conjunctiva/sclera: Conjunctivae normal.      Pupils: Pupils are equal, round, and reactive to light.   Cardiovascular:      Rate and Rhythm: Normal rate and regular rhythm.      Heart sounds: No murmur heard.    No friction rub. No gallop.   Pulmonary:      Effort: Pulmonary effort is normal.      Breath sounds: Normal breath sounds. No wheezing or rhonchi.   Skin:     General: Skin is warm and dry.   Neurological:      Mental Status: He is alert and oriented to person, place, and time.   Psychiatric:         Mood and Affect: Affect normal.        Result Review :                 Assessment and Plan    Diagnoses and all orders for this visit:    1. Seasonal allergic rhinitis due to " pollen (Primary)  Assessment & Plan:  Discussed with patient that his symptoms most likely represent allergic rhinitis exacerbation. Discussed treating with flonase daily and mucinex prn. Discussed the other possibility that he may have the flu on top of some allergy issues however patient declined wanting to be swabbed for influenza at this time. Will try to treat symptoms with OTC meds. Discussed with patient that symptoms should improve with continued use however if symptoms linger or worsen he should call the office for further evaluation. Patient verbalized understanding.    Orders:  -     fluticasone (Flonase) 50 MCG/ACT nasal spray; 2 sprays into the nostril(s) as directed by provider Daily.  Dispense: 16 g; Refill: 1  -     guaiFENesin (Mucinex) 600 MG 12 hr tablet; Take 2 tablets by mouth 2 (Two) Times a Day.  Dispense: 14 tablet; Refill: 0    Patient was instructed and educated on their diagnoses and treatment plan prior to leaving the office. If symptoms worsen or persist, seek emergency medical attention. Take all medications as prescribed. Call the office if any questions or concerns arise.     I spent 26 minutes caring for Collins on this date of service. This time includes time spent by me in the following activities:preparing for the visit, reviewing tests, obtaining and/or reviewing a separately obtained history, performing a medically appropriate examination and/or evaluation , counseling and educating the patient/family/caregiver, ordering medications, tests, or procedures, documenting information in the medical record and independently interpreting results and communicating that information with the patient/family/caregiver  Follow Up   Return if symptoms worsen or fail to improve, for Next scheduled follow up.  Patient was given instructions and counseling regarding his condition or for health maintenance advice. Please see specific information pulled into the AVS if appropriate.

## 2022-03-29 NOTE — ASSESSMENT & PLAN NOTE
Discussed with patient that his symptoms most likely represent allergic rhinitis exacerbation. Assessment unremarkable, lung sounds clear, O2 sats 98%. Discussed treating with flonase daily and mucinex prn. Discussed the other possibility that he may have the flu on top of some allergy issues however patient declined wanting to be swabbed for influenza at this time. Will try to treat symptoms with OTC meds. Discussed with patient that symptoms should improve with continued use however if symptoms linger or worsen he should call the office for further evaluation. Patient verbalized understanding.

## 2022-07-06 DIAGNOSIS — E55.9 VITAMIN D DEFICIENCY: ICD-10-CM

## 2022-07-06 NOTE — TELEPHONE ENCOUNTER
Caller: Collins Brumfield    Relationship: Self    Best call back number: [phone]    Requested Prescriptions:   Requested Prescriptions     Pending Prescriptions Disp Refills   • vitamin D (ERGOCALCIFEROL) 1.25 MG (08712 UT) capsule capsule 13 capsule 1     Sig: Take 1 capsule by mouth 1 (One) Time Per Week.        Pharmacy where request should be sent: [facility] [place] [facility] - [place], [place] - [address] [phone] [facility] [phone] FX     Additional details provided by patient: PATIENT IS CURRENTLY OUT OF THIS MEDICATION.     Does the patient have less than a 3 day supply:  [x] Yes  [] No    Bubba Cooper   07/06/22 13:27 EDT

## 2022-07-07 RX ORDER — ERGOCALCIFEROL 1.25 MG/1
50000 CAPSULE ORAL WEEKLY
Qty: 13 CAPSULE | Refills: 1 | Status: SHIPPED | OUTPATIENT
Start: 2022-07-07 | End: 2022-11-11

## 2022-11-11 ENCOUNTER — OFFICE VISIT (OUTPATIENT)
Dept: FAMILY MEDICINE CLINIC | Facility: CLINIC | Age: 72
End: 2022-11-11

## 2022-11-11 VITALS
HEART RATE: 77 BPM | WEIGHT: 246 LBS | TEMPERATURE: 97.2 F | DIASTOLIC BLOOD PRESSURE: 76 MMHG | HEIGHT: 72 IN | OXYGEN SATURATION: 95 % | RESPIRATION RATE: 18 BRPM | SYSTOLIC BLOOD PRESSURE: 126 MMHG | BODY MASS INDEX: 33.32 KG/M2

## 2022-11-11 DIAGNOSIS — E66.09 CLASS 1 OBESITY DUE TO EXCESS CALORIES WITHOUT SERIOUS COMORBIDITY WITH BODY MASS INDEX (BMI) OF 32.0 TO 32.9 IN ADULT: Chronic | ICD-10-CM

## 2022-11-11 DIAGNOSIS — F51.01 PRIMARY INSOMNIA: Chronic | ICD-10-CM

## 2022-11-11 DIAGNOSIS — E78.00 PURE HYPERCHOLESTEROLEMIA: Primary | Chronic | ICD-10-CM

## 2022-11-11 PROBLEM — G47.00 INSOMNIA, UNSPECIFIED: Chronic | Status: ACTIVE | Noted: 2019-07-26

## 2022-11-11 PROBLEM — J45.909 ASTHMA: Chronic | Status: ACTIVE | Noted: 2020-04-30

## 2022-11-11 PROBLEM — G47.30 SLEEP APNEA: Chronic | Status: ACTIVE | Noted: 2019-09-30

## 2022-11-11 PROBLEM — E78.5 HYPERLIPIDEMIA: Chronic | Status: ACTIVE | Noted: 2021-02-25

## 2022-11-11 PROCEDURE — 99214 OFFICE O/P EST MOD 30 MIN: CPT | Performed by: FAMILY MEDICINE

## 2022-11-11 NOTE — PROGRESS NOTES
"Chief Complaint  COPD    Subjective        Collins Brumfield presents to [facility]  History of Present Illness  He is here today to establish relations as a new patient.  He is a former patient of Ankit capone.  He has asthma, hyperlipidemia, vitamin D deficiency, insomnia, sleep apnea and polyneuropathy.  [facility]    The patient has no other complaints today and denies chest pain, shortness of breath, weakness, numbness, nausea, vomiting, diarrhea, dizziness or syncopal event.          Objective   Vital Signs:  /76 (BP Location: Left arm, Patient Position: Sitting)   Pulse 77   Temp 97.2 °F (36.2 °C)   Resp 18   Ht 182.9 cm (72.01\")   Wt 112 kg (246 lb)   SpO2 95%   BMI 33.36 kg/m²   Estimated body mass index is 33.36 kg/m² as calculated from the following:    Height as of this encounter: 182.9 cm (72.01\").    Weight as of this encounter: 112 kg (246 lb).          Physical Exam  Vitals reviewed.   Constitutional:       Appearance: Normal appearance. He is well-developed. He is obese.   HENT:      Head: Normocephalic and atraumatic.      Right Ear: External ear normal.      Left Ear: External ear normal.      Mouth/Throat:      Pharynx: No oropharyngeal exudate.   Eyes:      Conjunctiva/sclera: Conjunctivae normal.      Pupils: Pupils are equal, round, and reactive to light.   Neck:      Vascular: No carotid bruit.   Cardiovascular:      Rate and Rhythm: Normal rate and regular rhythm.      Heart sounds: No murmur heard.    No friction rub. No gallop.   Pulmonary:      Effort: Pulmonary effort is normal.      Breath sounds: Normal breath sounds. No wheezing or rhonchi.   Abdominal:      General: There is no distension.   Skin:     General: Skin is warm and dry.   Neurological:      Mental Status: He is alert and oriented to person, place, and time.      Cranial Nerves: No cranial nerve deficit.      Motor: No weakness.   Psychiatric:         Mood and " Affect: Mood and affect normal.         Behavior: Behavior normal.         Thought Content: Thought content normal.         Judgment: Judgment normal.        Result Review :    CMP    CMP 3/17/22   Glucose 95   BUN 9   Creatinine 1.27   Sodium 142   Potassium 4.2   Chloride 106   Calcium 9.7   Albumin 4.60   Total Bilirubin 0.6   Alkaline Phosphatase 64   AST (SGOT) 23   ALT (SGPT) 25           CBC    CBC 3/17/22   WBC 6.70   RBC 5.12   Hemoglobin 15.7   Hematocrit 47.0   MCV 91.8   MCH 30.7   MCHC 33.4   RDW 12.9   Platelets 215           Lipid Panel    Lipid Panel 3/17/22   Total Cholesterol 222 (A)   Triglycerides 164 (A)   HDL Cholesterol 36 (A)   VLDL Cholesterol 30   LDL Cholesterol  156 (A)   LDL/HDL Ratio 4.26   (A) Abnormal value            TSH    TSH 3/17/22   TSH 2.310                     Assessment and Plan   Diagnoses and all orders for this visit:    1. Pure hypercholesterolemia (Primary)  Assessment & Plan:  Lipid abnormalities are unchanged.  Nutritional counseling was provided.  Lipids will be reassessed in 6 months.      2. Primary insomnia  Assessment & Plan:  The patient is currently taking Ambien 10 mg nightly and doing well.  We will follow-up with him at his next clinic appointment and manage according to findings.      3. Class 1 obesity due to excess calories without serious comorbidity with body mass index (BMI) of 32.0 to 32.9 in adult  Assessment & Plan:  Patient's (Body mass index is 33.36 kg/m².) indicates that they are obese (BMI >30) with health conditions that include dyslipidemias . Weight is unchanged. BMI is is above average; BMI management plan is completed. We discussed low calorie, low carb based diet program, portion control and increasing exercise.              Follow Up   Return in about 6 months (around 5/11/2023).  Patient was given instructions and counseling regarding his condition or for health maintenance advice. Please see specific information pulled into the AVS if  appropriate.

## 2022-11-11 NOTE — ASSESSMENT & PLAN NOTE
The patient is currently taking Ambien 10 mg nightly and doing well.  We will follow-up with him at his next clinic appointment and manage according to findings.

## 2022-11-11 NOTE — ASSESSMENT & PLAN NOTE
Patient's (Body mass index is 33.36 kg/m².) indicates that they are obese (BMI >30) with health conditions that include dyslipidemias . Weight is unchanged. BMI is is above average; BMI management plan is completed. We discussed low calorie, low carb based diet program, portion control and increasing exercise.

## 2022-11-14 ENCOUNTER — APPOINTMENT (OUTPATIENT)
Dept: GENERAL RADIOLOGY | Facility: HOSPITAL | Age: 72
End: 2022-11-14

## 2022-11-14 ENCOUNTER — APPOINTMENT (OUTPATIENT)
Dept: CT IMAGING | Facility: HOSPITAL | Age: 72
End: 2022-11-14

## 2022-11-14 ENCOUNTER — APPOINTMENT (OUTPATIENT)
Dept: MRI IMAGING | Facility: HOSPITAL | Age: 72
End: 2022-11-14

## 2022-11-14 ENCOUNTER — HOSPITAL ENCOUNTER (EMERGENCY)
Facility: HOSPITAL | Age: 72
Discharge: HOME | End: 2022-11-14
Attending: EMERGENCY MEDICINE | Admitting: EMERGENCY MEDICINE

## 2022-11-14 VITALS
BODY MASS INDEX: 33.81 KG/M2 | WEIGHT: 249.34 LBS | RESPIRATION RATE: 20 BRPM | DIASTOLIC BLOOD PRESSURE: 62 MMHG | SYSTOLIC BLOOD PRESSURE: 114 MMHG | TEMPERATURE: 97 F | OXYGEN SATURATION: 98 % | HEART RATE: 72 BPM

## 2022-11-14 DIAGNOSIS — R47.01 EXPRESSIVE APHASIA: Primary | ICD-10-CM

## 2022-11-14 LAB
ABO GROUP BLD: NORMAL
ALBUMIN SERPL-MCNC: 4.6 G/DL (ref 3.5–5.2)
ALBUMIN/GLOB SERPL: 1.6 G/DL
ALP SERPL-CCNC: 70 U/L (ref 39–117)
ALT SERPL W P-5'-P-CCNC: 23 U/L (ref 1–41)
ANION GAP SERPL CALCULATED.3IONS-SCNC: 10.7 MMOL/L (ref 5–15)
APTT PPP: 27.7 SECONDS (ref 24.2–34.2)
AST SERPL-CCNC: 23 U/L (ref 1–40)
BASOPHILS # BLD AUTO: 0.05 10*3/MM3 (ref 0–0.2)
BASOPHILS NFR BLD AUTO: 0.7 % (ref 0–1.5)
BILIRUB SERPL-MCNC: 0.5 MG/DL (ref 0–1.2)
BILIRUB UR QL STRIP: NEGATIVE
BLD GP AB SCN SERPL QL: NEGATIVE
BUN SERPL-MCNC: 12 MG/DL (ref 8–23)
BUN/CREAT SERPL: 9.7 (ref 7–25)
CALCIUM SPEC-SCNC: 9.5 MG/DL (ref 8.6–10.5)
CHLORIDE SERPL-SCNC: 106 MMOL/L (ref 98–107)
CLARITY UR: CLEAR
CO2 SERPL-SCNC: 23.3 MMOL/L (ref 22–29)
COLOR UR: YELLOW
CREAT BLDA-MCNC: 1.2 MG/DL
CREAT SERPL-MCNC: 1.24 MG/DL (ref 0.76–1.27)
DEPRECATED RDW RBC AUTO: 43.9 FL (ref 37–54)
EGFRCR SERPLBLD CKD-EPI 2021: 62.5 ML/MIN/1.73
EGFRCR SERPLBLD CKD-EPI 2021: 65.1 ML/MIN/1.73
EOSINOPHIL # BLD AUTO: 0.36 10*3/MM3 (ref 0–0.4)
EOSINOPHIL NFR BLD AUTO: 5.1 % (ref 0.3–6.2)
ERYTHROCYTE [DISTWIDTH] IN BLOOD BY AUTOMATED COUNT: 12.8 % (ref 12.3–15.4)
GLOBULIN UR ELPH-MCNC: 2.8 GM/DL
GLUCOSE BLDC GLUCOMTR-MCNC: 97 MG/DL (ref 70–99)
GLUCOSE SERPL-MCNC: 102 MG/DL (ref 65–99)
GLUCOSE UR STRIP-MCNC: NEGATIVE MG/DL
HCT VFR BLD AUTO: 47.3 % (ref 37.5–51)
HGB BLD-MCNC: 15.6 G/DL (ref 13–17.7)
HGB UR QL STRIP.AUTO: NEGATIVE
HOLD SPECIMEN: NORMAL
HOLD SPECIMEN: NORMAL
IMM GRANULOCYTES # BLD AUTO: 0.03 10*3/MM3 (ref 0–0.05)
IMM GRANULOCYTES NFR BLD AUTO: 0.4 % (ref 0–0.5)
INR PPP: 1.02 (ref 0.86–1.15)
KETONES UR QL STRIP: NEGATIVE
LEUKOCYTE ESTERASE UR QL STRIP.AUTO: NEGATIVE
LYMPHOCYTES # BLD AUTO: 2.12 10*3/MM3 (ref 0.7–3.1)
LYMPHOCYTES NFR BLD AUTO: 30.2 % (ref 19.6–45.3)
MCH RBC QN AUTO: 31.1 PG (ref 26.6–33)
MCHC RBC AUTO-ENTMCNC: 33 G/DL (ref 31.5–35.7)
MCV RBC AUTO: 94.2 FL (ref 79–97)
MONOCYTES # BLD AUTO: 0.6 10*3/MM3 (ref 0.1–0.9)
MONOCYTES NFR BLD AUTO: 8.5 % (ref 5–12)
NEUTROPHILS NFR BLD AUTO: 3.86 10*3/MM3 (ref 1.7–7)
NEUTROPHILS NFR BLD AUTO: 55.1 % (ref 42.7–76)
NITRITE UR QL STRIP: NEGATIVE
NRBC BLD AUTO-RTO: 0 /100 WBC (ref 0–0.2)
PH UR STRIP.AUTO: 7 [PH] (ref 5–8)
PLATELET # BLD AUTO: 183 10*3/MM3 (ref 140–450)
PMV BLD AUTO: 10.1 FL (ref 6–12)
POTASSIUM SERPL-SCNC: 4.3 MMOL/L (ref 3.5–5.2)
PROT SERPL-MCNC: 7.4 G/DL (ref 6–8.5)
PROT UR QL STRIP: NEGATIVE
PROTHROMBIN TIME: 13.5 SECONDS (ref 11.8–14.9)
QT INTERVAL: 388 MS
RBC # BLD AUTO: 5.02 10*6/MM3 (ref 4.14–5.8)
RH BLD: POSITIVE
SODIUM SERPL-SCNC: 140 MMOL/L (ref 136–145)
SP GR UR STRIP: >1.03 (ref 1–1.03)
T&S EXPIRATION DATE: NORMAL
TROPONIN T SERPL-MCNC: <0.01 NG/ML (ref 0–0.03)
UROBILINOGEN UR QL STRIP: ABNORMAL
WBC NRBC COR # BLD: 7.02 10*3/MM3 (ref 3.4–10.8)
WHOLE BLOOD HOLD COAG: NORMAL
WHOLE BLOOD HOLD SPECIMEN: NORMAL

## 2022-11-14 PROCEDURE — 70551 MRI BRAIN STEM W/O DYE: CPT

## 2022-11-14 PROCEDURE — 80053 COMPREHEN METABOLIC PANEL: CPT | Performed by: EMERGENCY MEDICINE

## 2022-11-14 PROCEDURE — 86900 BLOOD TYPING SEROLOGIC ABO: CPT | Performed by: EMERGENCY MEDICINE

## 2022-11-14 PROCEDURE — 70496 CT ANGIOGRAPHY HEAD: CPT

## 2022-11-14 PROCEDURE — 85610 PROTHROMBIN TIME: CPT | Performed by: EMERGENCY MEDICINE

## 2022-11-14 PROCEDURE — 85025 COMPLETE CBC W/AUTO DIFF WBC: CPT | Performed by: EMERGENCY MEDICINE

## 2022-11-14 PROCEDURE — 0 IOPAMIDOL PER 1 ML: Performed by: EMERGENCY MEDICINE

## 2022-11-14 PROCEDURE — 81003 URINALYSIS AUTO W/O SCOPE: CPT | Performed by: EMERGENCY MEDICINE

## 2022-11-14 PROCEDURE — 93005 ELECTROCARDIOGRAM TRACING: CPT | Performed by: EMERGENCY MEDICINE

## 2022-11-14 PROCEDURE — 96374 THER/PROPH/DIAG INJ IV PUSH: CPT

## 2022-11-14 PROCEDURE — 82962 GLUCOSE BLOOD TEST: CPT

## 2022-11-14 PROCEDURE — 70450 CT HEAD/BRAIN W/O DYE: CPT

## 2022-11-14 PROCEDURE — 71045 X-RAY EXAM CHEST 1 VIEW: CPT

## 2022-11-14 PROCEDURE — 84484 ASSAY OF TROPONIN QUANT: CPT | Performed by: EMERGENCY MEDICINE

## 2022-11-14 PROCEDURE — 86850 RBC ANTIBODY SCREEN: CPT | Performed by: EMERGENCY MEDICINE

## 2022-11-14 PROCEDURE — 85730 THROMBOPLASTIN TIME PARTIAL: CPT | Performed by: EMERGENCY MEDICINE

## 2022-11-14 PROCEDURE — 86901 BLOOD TYPING SEROLOGIC RH(D): CPT | Performed by: EMERGENCY MEDICINE

## 2022-11-14 PROCEDURE — 82565 ASSAY OF CREATININE: CPT

## 2022-11-14 PROCEDURE — 99284 EMERGENCY DEPT VISIT MOD MDM: CPT

## 2022-11-14 PROCEDURE — 25010000002 LORAZEPAM PER 2 MG: Performed by: EMERGENCY MEDICINE

## 2022-11-14 PROCEDURE — 93010 ELECTROCARDIOGRAM REPORT: CPT | Performed by: INTERNAL MEDICINE

## 2022-11-14 PROCEDURE — 36415 COLL VENOUS BLD VENIPUNCTURE: CPT

## 2022-11-14 PROCEDURE — 0042T HC CT CEREBRAL PERFUSION W/WO CONTRAST: CPT

## 2022-11-14 PROCEDURE — 70498 CT ANGIOGRAPHY NECK: CPT

## 2022-11-14 RX ORDER — LORAZEPAM 2 MG/ML
1 INJECTION INTRAMUSCULAR ONCE
Status: COMPLETED | OUTPATIENT
Start: 2022-11-14 | End: 2022-11-14

## 2022-11-14 RX ORDER — SODIUM CHLORIDE 0.9 % (FLUSH) 0.9 %
10 SYRINGE (ML) INJECTION AS NEEDED
Status: DISCONTINUED | OUTPATIENT
Start: 2022-11-14 | End: 2022-11-14 | Stop reason: HOSPADM

## 2022-11-14 RX ADMIN — IOPAMIDOL 100 ML: 755 INJECTION, SOLUTION INTRAVENOUS at 12:10

## 2022-11-14 RX ADMIN — IOPAMIDOL 100 ML: 755 INJECTION, SOLUTION INTRAVENOUS at 12:18

## 2022-11-14 RX ADMIN — LORAZEPAM 1 MG: 2 INJECTION INTRAMUSCULAR; INTRAVENOUS at 12:57

## 2022-11-14 NOTE — ED NOTES
Pt reports he has claustrophobia and doesn't think he can do the MRI without medication to relax him , Dr. Colin aware and he will order something, MRI checklist completed and MRI aware.

## 2022-11-14 NOTE — ED PROVIDER NOTES
"Time: 11:36 AM EST    Chief Complaint: extremity weakness, facial droop  History provided by: patient  History is limited by: N/A     History of Present Illness:  Patient is a 72 y.o. year old male who presents to the emergency department with extremity weakness and facial droop. Patient states that he was last known well at 1:30 am. Patient states that at 8:30 am he woke up and started to have speech deficit and expressive aphasia. Patient denies any headaches. Patient denies any visual disturbance. Patient states he has \"no weakness of facial muscles.\" Patient denies any chest pain. Patient denies any abdominal pain. Patient denies any cardiac history. Patient has medical history of  stroke. Patient follows with his neurologist.       History provided by:  Patient   used: No        Similar Symptoms Previously: no  Recently seen: no      Patient Care Team  Primary Care Provider: Ankit Coleman PA    Past Medical History:     No Known Allergies  Past Medical History:   Diagnosis Date   • Aftercare following surgery 11/04/2020   • Allergic rhinitis due to allergen 04/30/2020   • Asthma 04/30/2020   • Cataract    • COPD (chronic obstructive pulmonary disease) ([facility])    • Dizziness 11/2019   • Erectile dysfunction 08/16/2020   • Foot fracture, right    • Fracture of nasal bones    • Heel pain    • HL (hearing loss)    • Hyperlipidemia 02/25/2021   • Ingrown toenail    • Insomnia, unspecified 07/06/2019   • Kidney stone    • Lumbago 06/02/2016    low back pain   • Lumbar spinal stenosis    • Sinusitis    • Sleep apnea 09/30/2019   • Stroke ([facility]) 11/2019   • Surgery follow-up 10/07/2020   • Synovial cyst     unspecified   • Tinnitus 11/2019     Past Surgical History:   Procedure Laterality Date   • ADENOIDECTOMY     • BACK SURGERY     • BACK SURGERY      lumbar    8 years?   • COLONOSCOPY  2018    normal   • EYE SURGERY  2021    Cataracts   • FOOT SURGERY Right    • HAND SURGERY     • LUMBAR LAMINECTOMY  " 2016    L2-3   • SINUS SURGERY     • TONSILLECTOMY       Family History   Problem Relation Age of Onset   • Cancer Father    • Cancer Brother    • Cancer Other         family history of cancer   • Hyperlipidemia Brother        Home Medications:  Prior to Admission medications    Medication Sig Start Date End Date Taking? Authorizing Provider   albuterol sulfate  (90 Base) MCG/ACT inhaler Inhale 2 puffs Every 4 (Four) Hours As Needed for Wheezing. 21   Ankit Coleman PA   fluticasone (Flonase) 50 MCG/ACT nasal spray 2 sprays into the nostril(s) as directed by provider Daily. 3/29/22   Jenna Senior APRN   fluticasone-salmeterol (ADVAIR HFA) 115-21 MCG/ACT inhaler Inhale 2 puffs 2 (Two) Times a Day. 21   Ankit Coleman PA   guaiFENesin (Mucinex) 600 MG 12 hr tablet Take 2 tablets by mouth 2 (Two) Times a Day. 3/29/22   Jenna Senior APRN   ibuprofen (ADVIL,MOTRIN) 600 MG tablet ibuprofen 600 mg oral tablet take 2 tablets by oral route 3 times a day   Suspended    Ccuo Ireland MD   ipratropium-albuterol (DUO-NEB) 0.5-2.5 mg/3 ml nebulizer Take 3 mL by nebulization Every 4 (Four) Hours As Needed for Wheezing.    ProviderCuco MD   montelukast (SINGULAIR) 10 MG tablet Take 10 mg by mouth Every Night.    Cuco Ireland MD   tadalafil (Cialis) 20 MG tablet Take 1 tablet by mouth Daily As Needed for Erectile Dysfunction. 22   Ankit Coleman PA   zolpidem (Ambien) 10 MG tablet Take 1 tablet by mouth At Night As Needed for Sleep. 21   Ankit Coleman PA        Social History:   Social History     Tobacco Use   • Smoking status: Former     Packs/day: 1.00     Years: 2.00     Pack years: 2.00     Types: Cigarettes, Pipe     Start date:      Quit date: 1970     Years since quittin.9   • Smokeless tobacco: Never   • Tobacco comments:     As a teen   Vaping Use   • Vaping Use: Never used   Substance Use Topics   • Alcohol use: Yes     Alcohol/week: 1.0 standard drink      Types: 1 Cans of beer per week   • Drug use: Never         Review of Systems:  Review of Systems   Constitutional: Negative for chills and fever.   HENT: Negative for congestion, rhinorrhea and sore throat.    Eyes: Negative for photophobia and visual disturbance.   Respiratory: Negative for apnea, cough, chest tightness and shortness of breath.    Cardiovascular: Negative for chest pain and palpitations.   Gastrointestinal: Negative for abdominal pain, diarrhea, nausea and vomiting.   Endocrine: Negative.    Genitourinary: Negative for difficulty urinating and dysuria.   Musculoskeletal: Negative for back pain, joint swelling and myalgias.   Skin: Negative for color change and wound.   Allergic/Immunologic: Negative.    Neurological: Positive for facial asymmetry and weakness. Negative for seizures and headaches.   Psychiatric/Behavioral: Negative.    All other systems reviewed and are negative.       Physical Exam:  /68   Pulse 68   Temp 97 °F (36.1 °C) (Oral)   Resp 18   Wt 113 kg (249 lb 5.4 oz)   SpO2 94%   BMI 33.81 kg/m²     Physical Exam  Vitals and nursing note reviewed.   Constitutional:       General: He is awake.      Appearance: Normal appearance.   HENT:      Head: Normocephalic and atraumatic.      Nose: Nose normal.      Mouth/Throat:      Mouth: Mucous membranes are moist.   Eyes:      Extraocular Movements: Extraocular movements intact.      Pupils: Pupils are equal, round, and reactive to light.   Cardiovascular:      Rate and Rhythm: Normal rate and regular rhythm.      Heart sounds: Normal heart sounds.   Pulmonary:      Effort: Pulmonary effort is normal. No respiratory distress.      Breath sounds: Normal breath sounds. No wheezing, rhonchi or rales.   Abdominal:      General: Bowel sounds are normal.      Palpations: Abdomen is soft.      Tenderness: There is no abdominal tenderness. There is no guarding or rebound.      Comments: No rigidity   Musculoskeletal:          General: No tenderness. Normal range of motion.      Cervical back: Normal range of motion and neck supple.   Skin:     General: Skin is warm and dry.      Coloration: Skin is not jaundiced.   Neurological:      General: No focal deficit present.      Mental Status: He is alert and oriented to person, place, and time. Mental status is at baseline.      Cranial Nerves: No facial asymmetry.      Sensory: Sensory deficit (Mild left upper and left lower extremity with no facial numbness/paresthesias.) present.      Motor: Weakness (face isolated to forehead muscles and left eyebrow.  Weakness does not involve the mouth.) present.      Coordination: Coordination is intact.      Comments: NIH stroke scale of 2   Psychiatric:         Attention and Perception: Attention and perception normal.         Mood and Affect: Mood and affect normal.         Speech: Speech normal.         Behavior: Behavior normal.         Judgment: Judgment normal.                Medications in the Emergency Department:  Medications   sodium chloride 0.9 % flush 10 mL (has no administration in time range)   iopamidol (ISOVUE-370) 76 % injection 100 mL (100 mL Intravenous Given 11/14/22 1210)   iopamidol (ISOVUE-370) 76 % injection 100 mL (100 mL Intravenous Given 11/14/22 1218)   LORazepam (ATIVAN) injection 1 mg (1 mg Intravenous Given 11/14/22 1257)        Labs  Lab Results (last 24 hours)     Procedure Component Value Units Date/Time    POC Creatinine with Hold Tube [[id number]] Updated: 11/14/22 1153    Specimen: Blood     Narrative:      The following orders were created for panel order POC Creatinine with Hold Tube.  Procedure                               Abnormality         Status                     ---------                               -----------         ------                     POC Creatinine[[id number]]                                                              Hold Creatinine Tube[[id number]]                                                           Please view results for these tests on the individual orders.    POC Glucose Once [[id number]]  (Normal) Collected: 11/14/22 1137    Specimen: Blood Updated: 11/14/22 1139     Glucose 97 mg/dL      Comment: Serial Number: [id number]:  [id number]       CBC & Differential [[id number]]  (Normal) Collected: 11/14/22 1140    Specimen: Blood Updated: 11/14/22 1153    Narrative:      The following orders were created for panel order CBC & Differential.  Procedure                               Abnormality         Status                     ---------                               -----------         ------                     CBC Auto Differential[[id number]]        Normal              Final result                 Please view results for these tests on the individual orders.    Comprehensive Metabolic Panel [[id number]]  (Abnormal) Collected: 11/14/22 1140    Specimen: Blood Updated: 11/14/22 1220     Glucose 102 mg/dL      BUN 12 mg/dL      Creatinine 1.24 mg/dL      Sodium 140 mmol/L      Potassium 4.3 mmol/L      Comment: Slight hemolysis detected by analyzer. Results may be affected.        Chloride 106 mmol/L      CO2 23.3 mmol/L      Calcium 9.5 mg/dL      Total Protein 7.4 g/dL      Albumin 4.60 g/dL      ALT (SGPT) 23 U/L      AST (SGOT) 23 U/L      Comment: Slight hemolysis detected by analyzer. Results may be affected.        Alkaline Phosphatase 70 U/L      Total Bilirubin 0.5 mg/dL      Globulin 2.8 gm/dL      A/G Ratio 1.6 g/dL      BUN/Creatinine Ratio 9.7     Anion Gap 10.7 mmol/L      eGFR 62.5 mL/min/1.73      Comment: National Kidney Foundation and American Society of Nephrology (ASN) Task Force recommended calculation based on the Chronic Kidney Disease Epidemiology Collaboration (CKD-EPI) equation refit without adjustment for race.       Narrative:      GFR Normal >60  Chronic Kidney Disease <60  Kidney Failure <15      Protime-INR [[id number]]  (Normal) Collected: 11/14/22 1140     Specimen: Blood Updated: 11/14/22 1211     Protime 13.5 Seconds      INR 1.02    Narrative:      Suggested Therapeutic Ranges For Oral Anticoagulant Therapy:  Level of Therapy                      INR Target Range  Standard Dose                            2.0-3.0  High Dose                                2.5-3.5  Patients not receiving anticoagulant  Therapy Normal Range                     0.86-1.15    aPTT [[id number]]  (Normal) Collected: 11/14/22 1140    Specimen: Blood Updated: 11/14/22 1211     PTT 27.7 seconds     Troponin [[id number]]  (Normal) Collected: 11/14/22 1140    Specimen: Blood Updated: 11/14/22 1215     Troponin T <0.010 ng/mL     Narrative:      Troponin T Reference Range:  <= 0.03 ng/mL-   Negative for AMI  >0.03 ng/mL-     Abnormal for myocardial necrosis.  Clinicians would have to utilize clinical acumen, EKG, Troponin and serial changes to determine if it is an Acute Myocardial Infarction or myocardial injury due to an underlying chronic condition.       Results may be falsely decreased if patient taking Biotin.      CBC Auto Differential [[id number]]  (Normal) Collected: 11/14/22 1140    Specimen: Blood Updated: 11/14/22 1153     WBC 7.02 10*3/mm3      RBC 5.02 10*6/mm3      Hemoglobin 15.6 g/dL      Hematocrit 47.3 %      MCV 94.2 fL      MCH 31.1 pg      MCHC 33.0 g/dL      RDW 12.8 %      RDW-SD 43.9 fl      MPV 10.1 fL      Platelets 183 10*3/mm3      Neutrophil % 55.1 %      Lymphocyte % 30.2 %      Monocyte % 8.5 %      Eosinophil % 5.1 %      Basophil % 0.7 %      Immature Grans % 0.4 %      Neutrophils, Absolute 3.86 10*3/mm3      Lymphocytes, Absolute 2.12 10*3/mm3      Monocytes, Absolute 0.60 10*3/mm3      Eosinophils, Absolute 0.36 10*3/mm3      Basophils, Absolute 0.05 10*3/mm3      Immature Grans, Absolute 0.03 10*3/mm3      nRBC 0.0 /100 WBC     POC Creatinine [[id number]] Collected: 11/14/22 1141    Specimen: Blood Updated: 11/14/22 1200     Creatinine 1.20 mg/dL      Comment:  Serial Number: [id number]:  [id number]        eGFR 65.1 mL/min/1.73     Urinalysis With Microscopic If Indicated (No Culture) - Urine, Clean Catch [[id number]]  (Abnormal) Collected: 11/14/22 1605    Specimen: Urine, Clean Catch Updated: 11/14/22 1614     Color, UA Yellow     Appearance, UA Clear     pH, UA 7.0     Specific Gravity, UA >1.030     Glucose, UA Negative     Ketones, UA Negative     Bilirubin, UA Negative     Blood, UA Negative     Protein, UA Negative     Leuk Esterase, UA Negative     Nitrite, UA Negative     Urobilinogen, UA 0.2 E.U./dL    Narrative:      Urine microscopic not indicated.           Imaging:  CT Angiogram Neck    Result Date: 11/14/2022  PROCEDURE: CT ANGIOGRAM NECK, 11/14/2022, 11:59 CT ANGIOGRAM HEAD W AI ANALYSIS OF LVO, 11/14/2022, 11:59  COMPARISON: [facility], CT, CT ANGIOGRAM HEAD, 7/29/2021, 15:29.  [facility], CT, CT ANGIOGRAM NECK, 7/29/2021, 15:29.  INDICATIONS: Neuro deficit, acute, stroke suspected, left side weakness  PROTOCOL:   Standard imaging protocol performed    RADIATION:   DLP: 981.5mGy*cm   Automated exposure control was utilized to minimize radiation dose. CONTRAST: 100cc Isovue 370 I.V.  TECHNIQUE: CT images of the neck were obtained without and with non-ionic intravenous contrast material. Multi-planar reformatted/3-D images were created to optimize visualization of vascular anatomy. Unless otherwise stated in this report, all vascular stenoses involving the internal carotid arteries reported for this examination are derived by dividing the lesion diameter by the diameter of the normal internal carotid artery more distally.  FINDINGS:  The great vessels have a normal pattern of origin from the aortic arch.  The innominate artery and the subclavian arteries are patent.  The common carotid arteries are patent.  The carotid bifurcations are patent.  The internal carotid arteries are patent.  The vertebral arteries are  patent.  The intracranial portions of the internal carotid arteries are patent.  Symmetrical flow is seen in the middle cerebral artery complexes.  The anterior cerebral arteries are patent.  The anterior communicating artery is patent.  The basilar artery is patent.  No stenotic lesion is evident.  No aneurysm is seen.  CONTINUED ON NEXT PAGE...    No soft tissue mass or adenopathy is seen the thyroid gland has a normal appearance.  No abnormality is seen at the lung apices.        CT angiogram of the head and neck demonstrating no stenosis.     REA BROWNE MD       Electronically Signed and Approved By: REA BROWNE MD on 11/14/2022 at 13:14             MRI Brain Without Contrast    Result Date: 11/14/2022  PROCEDURE: MRI BRAIN WO CONTRAST  COMPARISON: [facility], MR, BRAIN W/O CONTRAST, 12/04/2016, 15:52.  [facility], CT, CT HEAD WO CONTRAST STROKE PROTOCOL, 11/14/2022, 11:38.  [facility], MR, MRA BRAIN W/O CONTRAST, 10/18/2019, 8:20.  INDICATIONS: SLURRED SPEECH AND TROUBLE FORMING WORDS  TECHNIQUE: Multiplanar images of the brain were obtained in a high field strength magnet.  FINDINGS:  The ventricles, sulci, and cerebellar folia are mildly and diffusely prominent consistent with atrophy.  Mild white matter signal abnormality is consistent with small vessel disease and/or scattered lacunar infarcts.  No abnormal bright signal is seen on diffusion weighted images.  No restricted diffusion is evident.  No abnormal dark signal is seen on magnetic susceptibility sequence sensitive for blood products.  No abnormality of the orbits or pituitary is evident.  Mild mucosal thickening is seen in the maxillary antra, ethmoid air cells, and sphenoid sinuses.  No air-fluid levels are seen.  No abnormal mastoid signal is seen.        MR examination of the brain without IV contrast demonstrating no acute intracranial abnormality.      REA BROWNE MD       Electronically  Signed and Approved By: REA BROWNE MD on 11/14/2022 at 14:28             XR Chest 1 View    Result Date: 11/14/2022  PROCEDURE: XR CHEST 1 VW  COMPARISON: [facility], CR, CHEST PA/AP & LAT 2V, 5/03/2021, 12:16.  INDICATIONS: Acute Stroke Protocol (Onset < 12 hrs), extremity weakness, facial droop  FINDINGS:  The heart is normal in size.  The lungs are well-expanded and free of infiltrates.  Bony structures appear intact.       No active disease is seen.       REA BROWNE MD       Electronically Signed and Approved By: REA BROWNE MD on 11/14/2022 at 13:04             CT Head Without Contrast Stroke Protocol    Result Date: 11/14/2022  PROCEDURE: CT HEAD WO CONTRAST STROKE PROTOCOL  COMPARISON:  [facility], CT, HEAD W/O CONTRAST, 12/04/2016, 12:34.  [facility], CT, CT ANGIOGRAM HEAD, 7/29/2021, 15:29. INDICATIONS: Neuro deficit, acute, stroke suspected, left side weakness  PROTOCOL:   Standard imaging protocol performed    RADIATION:      MA and/or KV was adjusted to minimize radiation dose.     TECHNIQUE: After obtaining the patient's consent, CT images were obtained without non-ionic intravenous contrast material.  FINDINGS:  There is generalized enlargement of the ventricles and CSF containing spaces.  There is decreased attenuation in the periventricular white matter in the parietal regions bilaterally.  This appears symmetric.  It has increased from the studies of 2016. Ventricular size likewise has increased.  No mass lesions, mass effect, acute hemorrhage or edema.  No intra or extra-axial fluid collections are present.  There is mucosal disease identified in the maxillary sinuses bilaterally.  There is mild mucosal disease in the sphenoid sinuses and minimal mucosal disease in the ethmoid air cells.  The mastoid air cells are clear.        1. Generalized atrophy with chronic ischemic changes in the periventricular white matter particularly in  the parietal lobes bilaterally.  This has progressed somewhat since the earlier study of 2016. 2. Mucosal disease within the maxillary, ethmoid, and sphenoid sinuses, most likely chronic in nature.  3. No acute intracranial findings     Tonio Fonseca MD       Electronically Signed and Approved By: Tonio Fonseca MD on 11/14/2022 at 12:17             CT Angiogram Head w AI Analysis of LVO    Result Date: 11/14/2022  PROCEDURE: CT ANGIOGRAM NECK, 11/14/2022, 11:59 CT ANGIOGRAM HEAD W AI ANALYSIS OF LVO, 11/14/2022, 11:59  COMPARISON: [facility], CT, CT ANGIOGRAM HEAD, 7/29/2021, 15:29.  [facility], CT, CT ANGIOGRAM NECK, 7/29/2021, 15:29.  INDICATIONS: Neuro deficit, acute, stroke suspected, left side weakness  PROTOCOL:   Standard imaging protocol performed    RADIATION:   DLP: 981.5mGy*cm   Automated exposure control was utilized to minimize radiation dose. CONTRAST: 100cc Isovue 370 I.V.  TECHNIQUE: CT images of the neck were obtained without and with non-ionic intravenous contrast material. Multi-planar reformatted/3-D images were created to optimize visualization of vascular anatomy. Unless otherwise stated in this report, all vascular stenoses involving the internal carotid arteries reported for this examination are derived by dividing the lesion diameter by the diameter of the normal internal carotid artery more distally.  FINDINGS:  The great vessels have a normal pattern of origin from the aortic arch.  The innominate artery and the subclavian arteries are patent.  The common carotid arteries are patent.  The carotid bifurcations are patent.  The internal carotid arteries are patent.  The vertebral arteries are patent.  The intracranial portions of the internal carotid arteries are patent.  Symmetrical flow is seen in the middle cerebral artery complexes.  The anterior cerebral arteries are patent.  The anterior communicating artery is patent.  The basilar artery  is patent.  No stenotic lesion is evident.  No aneurysm is seen.  CONTINUED ON NEXT PAGE...    No soft tissue mass or adenopathy is seen the thyroid gland has a normal appearance.  No abnormality is seen at the lung apices.        CT angiogram of the head and neck demonstrating no stenosis.     REA BROWNE MD       Electronically Signed and Approved By: REA BROWNE MD on 11/14/2022 at 13:14             CT CEREBRAL PERFUSION WITH & WITHOUT CONTRAST    Result Date: 11/14/2022  PROCEDURE: CT CEREBRAL PERFUSION W WO CONTRAST  COMPARISON: [facility], CT, CT HEAD WO CONTRAST STROKE PROTOCOL, 11/14/2022, 11:38.  INDICATIONS: Neuro deficit, acute, stroke suspected, left side weakness  PROTOCOL:   Standard imaging protocol performed    RADIATION:   DLP: 1293.6mGy*cm   Automated exposure control was utilized to minimize radiation dose.  80cc Isovue 370 I.V.   FINDINGS: No perfusion abnormality is evident.        Negative CT cerebral perfusion scan.      REA BROWNE MD       Electronically Signed and Approved By: REA BROWNE MD on 11/14/2022 at 12:23               Procedures:  Procedures    Progress  ED Course as of 11/14/22 1643   Mon Nov 14, 2022   1641 EKG interpretation: Normal sinus rhythm, heart 73, normal NV and QT intervals, normal QRS duration, normal axis, normal ST segments with no acute ischemia. [RP]      ED Course User Index  [RP] Chance Colin MD                            Medical Decision Making:  MDM  Number of Diagnoses or Management Options  Expressive aphasia: new and requires workup     Amount and/or Complexity of Data Reviewed  Clinical lab tests: reviewed  Tests in the radiology section of CPT®: reviewed  Tests in the medicine section of CPT®: reviewed  Independent visualization of images, tracings, or specimens: yes    Risk of Complications, Morbidity, and/or Mortality  Presenting problems: moderate  Management options: low    Patient Progress  Patient progress: stable  (15:36 EST Updated patient on results and plan. Patient expressed understanding and agreement. All questions answered at this time. Patient's stroke test came back negative, so the patient is not having stroke.)       Final diagnoses:   Expressive aphasia        Disposition:  ED Disposition     ED Disposition   Discharge    Condition   Stable    Comment   --             This medical record created using voice recognition software.        Documentation assistance provided by Leola Carrington acting as scribe for Chance Colin MD. Information recorded by the scribe was done at my direction and has been verified and validated by me.          Leola Carrington  11/14/22 1150       Leola Carrington  11/14/22 1539       Chance Colin MD  11/14/22 [id number]

## 2022-11-14 NOTE — ED NOTES
Pt assisted out of ED via wheelchair with family to take home, all questions answered and no needs voiced.

## 2022-11-14 NOTE — DISCHARGE INSTRUCTIONS
Return to the emergency department immediately for worsening symptoms.  Stroke work-up today is negative thankfully but this does need further investigation.  I would recommend following up with your primary care physician and outpatient neurology.  Return to the emergency department immediately for any one-sided numbness, tingling, weakness, vision changes, severe headache, facial droop.

## 2022-11-14 NOTE — NURSING NOTE
"1230 responded to Stroke alert. Patient reports that he woke up with trouble finding words. He states that he cannot raise his left brow. Upon reassessment patient is tearful and still concerned with: \" I know what I want to say, but it does not sound right.\" We discussed risk factors to family history, any diabetes, hypertension and cholesterol problems. Patient denies and of the above. We discussed the purpose of the imaging and that the MD would be in to discuss as soon as they are ready. Patient, wife and daughter verbalize understanding of plan.   "

## 2022-11-18 ENCOUNTER — TELEPHONE (OUTPATIENT)
Dept: FAMILY MEDICINE CLINIC | Facility: CLINIC | Age: 72
End: 2022-11-18

## 2022-11-18 RX ORDER — ROSUVASTATIN CALCIUM 20 MG/1
20 TABLET, COATED ORAL DAILY
Qty: 90 TABLET | Refills: 1 | Status: SHIPPED | OUTPATIENT
Start: 2022-11-18

## 2022-12-05 ENCOUNTER — TRANSCRIBE ORDERS (OUTPATIENT)
Dept: ADMINISTRATIVE | Facility: HOSPITAL | Age: 72
End: 2022-12-05

## 2022-12-05 DIAGNOSIS — G45.9 INTERMITTENT CEREBRAL ISCHEMIA: Primary | ICD-10-CM

## 2022-12-09 ENCOUNTER — HOSPITAL ENCOUNTER (OUTPATIENT)
Dept: CARDIOLOGY | Facility: HOSPITAL | Age: 72
Discharge: HOME | End: 2022-12-09
Admitting: FAMILY MEDICINE

## 2022-12-09 DIAGNOSIS — G45.9 INTERMITTENT CEREBRAL ISCHEMIA: ICD-10-CM

## 2022-12-09 LAB
BH CV ECHO MEAS - AI P1/2T: 614 MSEC
BH CV ECHO MEAS - AO ROOT DIAM: 3 CM
BH CV ECHO MEAS - EF(MOD-BP): 65 %
BH CV ECHO MEAS - IVSD: 1.4 CM
BH CV ECHO MEAS - LA DIMENSION: 4 CM
BH CV ECHO MEAS - LAT PEAK E' VEL: 9.3 CM/SEC
BH CV ECHO MEAS - LVIDD: 4.8 CM
BH CV ECHO MEAS - LVIDS: 3 CM
BH CV ECHO MEAS - LVOT DIAM: 2.4 CM
BH CV ECHO MEAS - LVPWD: 1.4 CM
BH CV ECHO MEAS - MED PEAK E' VEL: 7.29 CM/SEC
BH CV ECHO MEAS - MR MAX PG: 77 MMHG
BH CV ECHO MEAS - MR MAX VEL: 438 CM/SEC
BH CV ECHO MEAS - MV A MAX VEL: 81.8 CM/SEC
BH CV ECHO MEAS - MV DEC TIME: 266 MSEC
BH CV ECHO MEAS - MV E MAX VEL: 76.5 CM/SEC
BH CV ECHO MEAS - MV E/A: 0.9
BH CV ECHO MEAS - RVDD: 3.7 CM
BH CV ECHO MEAS - TR MAX PG: 19 MMHG
BH CV ECHO MEAS - TR MAX VEL: 217 CM/SEC
BH CV ECHO MEASUREMENTS AVERAGE E/E' RATIO: 9.22
LEFT ATRIUM VOLUME INDEX: 28.7 ML/M2
MAXIMAL PREDICTED HEART RATE: 150 BPM
STRESS TARGET HR: 128 BPM

## 2022-12-09 PROCEDURE — 93306 TTE W/DOPPLER COMPLETE: CPT

## 2023-02-08 DIAGNOSIS — G47.00 INSOMNIA, UNSPECIFIED TYPE: Chronic | ICD-10-CM

## 2023-02-08 RX ORDER — ZOLPIDEM TARTRATE 10 MG/1
10 TABLET ORAL NIGHTLY PRN
Qty: 30 TABLET | Refills: 5 | Status: SHIPPED | OUTPATIENT
Start: 2023-02-08

## 2023-02-08 RX ORDER — ZOLPIDEM TARTRATE 10 MG/1
10 TABLET ORAL NIGHTLY PRN
Qty: 30 TABLET | Refills: 5 | Status: SHIPPED | OUTPATIENT
Start: 2023-02-08 | End: 2023-02-08

## 2023-02-08 NOTE — TELEPHONE ENCOUNTER
----- Message from Tanesha Brumfield on behalf of Collins Brumfield sent at 2/8/2023  9:32 AM EST -----  Regarding: Zolpiden  Contact: [phone]  This message is being sent by Tanesha Brumfield on behalf of Collins Brumfield.    Doc,  My Chart will not allow me to request a refill on Zolpidem . Could you send a prescription to [facility]?   Thank you  Collins

## 2023-02-09 NOTE — TELEPHONE ENCOUNTER
Please send this back to me. [facility] does not take electronic controlled scipts. I will have to write it by hand.

## 2023-06-01 ENCOUNTER — OFFICE VISIT (OUTPATIENT)
Dept: FAMILY MEDICINE CLINIC | Facility: CLINIC | Age: 73
End: 2023-06-01

## 2023-06-01 VITALS
BODY MASS INDEX: 30.87 KG/M2 | HEART RATE: 69 BPM | HEIGHT: 72 IN | OXYGEN SATURATION: 98 % | TEMPERATURE: 97.3 F | SYSTOLIC BLOOD PRESSURE: 122 MMHG | WEIGHT: 227.9 LBS | DIASTOLIC BLOOD PRESSURE: 66 MMHG | RESPIRATION RATE: 18 BRPM

## 2023-06-01 DIAGNOSIS — R73.9 ELEVATED BLOOD SUGAR: ICD-10-CM

## 2023-06-01 DIAGNOSIS — Z00.00 MEDICARE ANNUAL WELLNESS VISIT, SUBSEQUENT: Primary | ICD-10-CM

## 2023-06-01 DIAGNOSIS — Z00.00 ANNUAL PHYSICAL EXAM: ICD-10-CM

## 2023-06-01 DIAGNOSIS — Z12.5 SCREENING FOR PROSTATE CANCER: ICD-10-CM

## 2023-06-01 DIAGNOSIS — N52.9 MALE ERECTILE DISORDER: ICD-10-CM

## 2023-06-01 DIAGNOSIS — E66.09 CLASS 1 OBESITY DUE TO EXCESS CALORIES WITH SERIOUS COMORBIDITY AND BODY MASS INDEX (BMI) OF 30.0 TO 30.9 IN ADULT: ICD-10-CM

## 2023-06-01 DIAGNOSIS — J45.40 MODERATE PERSISTENT ASTHMA WITHOUT COMPLICATION: Chronic | ICD-10-CM

## 2023-06-01 DIAGNOSIS — Z11.59 NEED FOR HEPATITIS C SCREENING TEST: ICD-10-CM

## 2023-06-01 DIAGNOSIS — E78.00 PURE HYPERCHOLESTEROLEMIA: Chronic | ICD-10-CM

## 2023-06-01 DIAGNOSIS — F51.01 PRIMARY INSOMNIA: Chronic | ICD-10-CM

## 2023-06-01 RX ORDER — TADALAFIL 20 MG/1
20 TABLET ORAL DAILY PRN
Qty: 90 TABLET | Refills: 3 | Status: SHIPPED | OUTPATIENT
Start: 2023-06-01

## 2023-06-01 RX ORDER — AZITHROMYCIN 250 MG/1
TABLET, FILM COATED ORAL
Qty: 6 TABLET | Refills: 0 | Status: SHIPPED | OUTPATIENT
Start: 2023-06-01

## 2023-06-01 RX ORDER — PREDNISONE 20 MG/1
TABLET ORAL
Qty: 18 TABLET | Refills: 0 | Status: SHIPPED | OUTPATIENT
Start: 2023-06-01 | End: 2023-06-10

## 2023-06-01 RX ORDER — FLUTICASONE FUROATE AND VILANTEROL 100; 25 UG/1; UG/1
1 POWDER RESPIRATORY (INHALATION)
Qty: 56 EACH | Refills: 0 | COMMUNITY
Start: 2023-06-01

## 2023-06-01 NOTE — ASSESSMENT & PLAN NOTE
Patient's (Body mass index is 30.9 kg/m².) indicates that they are obese (BMI >30) with health conditions that include dyslipidemias . Weight is improving with lifestyle modifications. BMI  is above average; BMI management plan is completed. We discussed low calorie, low carb based diet program, portion control and increasing exercise.

## 2023-06-01 NOTE — PATIENT INSTRUCTIONS
Advance Care Planning and Advance Directives     You make decisions on a daily basis - decisions about where you want to live, your career, your home, your life. Perhaps one of the most important decisions you face is your choice for future medical care. Take time to talk with your family and your healthcare team and start planning today.  Advance Care Planning is a process that can help you:  Understand possible future healthcare decisions in light of your own experiences  Reflect on those decision in light of your goals and values  Discuss your decisions with those closest to you and the healthcare professionals that care for you  Make a plan by creating a document that reflects your wishes    Surrogate Decision Maker  In the event of a medical emergency, which has left you unable to communicate or to make your own decisions, you would need someone to make decisions for you.  It is important to discuss your preferences for medical treatment with this person while you are in good health.     Qualities of a surrogate decision maker:  Willing to take on this role and responsibility  Knows what you want for future medical care  Willing to follow your wishes even if they don't agree with them  Able to make difficult medical decisions under stressful circumstances    Advance Directives  These are legal documents you can create that will guide your healthcare team and decision maker(s) when needed. These documents can be stored in the electronic medical record.    Living Will - a legal document to guide your care if you have a terminal condition or a serious illness and are unable to communicate. States vary by statute in document names/types, but most forms may include one or more of the following:        -  Directions regarding life-prolonging treatments        -  Directions regarding artificially provided nutrition/hydration        -  Choosing a healthcare decision maker        -  Direction regarding organ/tissue  donation    Durable Power of  for Healthcare - this document names an -in-fact to make medical decisions for you, but it may also allow this person to make personal and financial decisions for you. Please seek the advice of an  if you need this type of document.    **Advance Directives are not required and no one may discriminate against you if you do not sign one.    Medical Orders  Many states allow specific forms/orders signed by your physician to record your wishes for medical treatment in your current state of health. This form, signed in personal communication with your physician, addresses resuscitation and other medical interventions that you may or may not want.      For more information or to schedule a time with a [facility] Advance Care Planning Facilitator contact: [email or web address] or call [phone] and someone will contact you directly.

## 2023-06-01 NOTE — ASSESSMENT & PLAN NOTE
Asthma is worsening.  The patient is experiencing monthly daytime asthma symptoms. He is experiencing frequent nighttime asthma symptoms.  Medications: discontinue Advair. Start Breo once per day.

## 2023-06-01 NOTE — ASSESSMENT & PLAN NOTE
The patient's insomnia is currently well controlled with 10 mg of Ambien nightly.  He will be continued on this dosage and we will see him back at his next clinic appointment manage according to findings.

## 2023-06-01 NOTE — PROGRESS NOTES
The ABCs of the Annual Wellness Visit  Subsequent Medicare Wellness Visit    Subjective    Collins Brumfield is a 73 y.o. male who presents for a Subsequent Medicare Wellness Visit.    The following portions of the patient's history were reviewed and   updated as appropriate: allergies, current medications, past family history, past medical history, past social history, past surgical history and problem list.    Compared to one year ago, the patient feels his physical   health is better.    Compared to one year ago, the patient feels his mental   health is the same.    Recent Hospitalizations:  He was not admitted to the hospital during the last year.       Current Medical Providers:  Patient Care Team:  Martha Alba DO as PCP - General (Family Medicine)    Outpatient Medications Prior to Visit   Medication Sig Dispense Refill   • albuterol sulfate  (90 Base) MCG/ACT inhaler Inhale 2 puffs Every 4 (Four) Hours As Needed for Wheezing. 3 g 3   • fluticasone (Flonase) 50 MCG/ACT nasal spray 2 sprays into the nostril(s) as directed by provider Daily. 16 g 1   • fluticasone-salmeterol (ADVAIR HFA) 115-21 MCG/ACT inhaler Inhale 2 puffs 2 (Two) Times a Day. 3 each 3   • guaiFENesin (Mucinex) 600 MG 12 hr tablet Take 2 tablets by mouth 2 (Two) Times a Day. 14 tablet 0   • ibuprofen (ADVIL,MOTRIN) 600 MG tablet ibuprofen 600 mg oral tablet take 2 tablets by oral route 3 times a day   Suspended     • ipratropium-albuterol (DUO-NEB) 0.5-2.5 mg/3 ml nebulizer Take 3 mL by nebulization Every 4 (Four) Hours As Needed for Wheezing.     • montelukast (SINGULAIR) 10 MG tablet Take 1 tablet by mouth Every Night.     • rosuvastatin (Crestor) 20 MG tablet Take 1 tablet by mouth Daily. 90 tablet 1   • zolpidem (Ambien) 10 MG tablet Take 1 tablet by mouth At Night As Needed for Sleep. 30 tablet 5   • tadalafil (Cialis) 20 MG tablet Take 1 tablet by mouth Daily As Needed for Erectile Dysfunction. 90 tablet 0     No  "facility-administered medications prior to visit.       No opioid medication identified on active medication list. I have reviewed chart for other potential  high risk medication/s and harmful drug interactions in the elderly.          Aspirin is not on active medication list.  Aspirin use is not indicated based on review of current medical condition/s. Risk of harm outweighs potential benefits.  .    Patient Active Problem List   Diagnosis   • Allergic rhinitis due to allergen   • Hyperlipidemia   • Male erectile disorder   • Insomnia, unspecified   • Lumbar spinal stenosis   • Asthma   • Sleep apnea   • Polyneuropathy   • Class 1 obesity due to excess calories with serious comorbidity and body mass index (BMI) of 30.0 to 30.9 in adult   • Medicare annual wellness visit, subsequent     Advance Care Planning   Advance Care Planning     Advance Directive is not on file.  ACP discussion was held with the patient during this visit. Patient does not have an advance directive, information provided.     Objective    Vitals:    23 1501   BP: 122/66   BP Location: Left arm   Patient Position: Sitting   Pulse: 69   Resp: 18   Temp: 97.3 °F (36.3 °C)   SpO2: 98%   Weight: 103 kg (227 lb 14.4 oz)   Height: 182.9 cm (72.01\")   PainSc: 0-No pain     Estimated body mass index is 30.9 kg/m² as calculated from the following:    Height as of this encounter: 182.9 cm (72.01\").    Weight as of this encounter: 103 kg (227 lb 14.4 oz).    BMI is >= 30 and <35. (Class 1 Obesity). The following options were offered after discussion;: nutrition counseling/recommendations      Does the patient have evidence of cognitive impairment? No          HEALTH RISK ASSESSMENT    Smoking Status:  Social History     Tobacco Use   Smoking Status Former   • Packs/day: 1.00   • Years: 2.00   • Pack years: 2.00   • Types: Cigarettes, Pipe   • Start date:    • Quit date:    • Years since quittin.4   Smokeless Tobacco Never   Tobacco " Comments    As a teen     Alcohol Consumption:  Social History     Substance and Sexual Activity   Alcohol Use Not Currently   • Alcohol/week: 1.0 standard drink   • Types: 1 Cans of beer per week     Fall Risk Screen:    OMID Fall Risk Assessment was completed, and patient is at LOW risk for falls.Assessment completed on:2023    Depression Screenin/1/2023     2:56 PM   PHQ-2/PHQ-9 Depression Screening   Little Interest or Pleasure in Doing Things 0-->not at all   Feeling Down, Depressed or Hopeless 0-->not at all   PHQ-9: Brief Depression Severity Measure Score 0       Health Habits and Functional and Cognitive Screenin/1/2023     2:00 PM   Functional & Cognitive Status   Do you have difficulty preparing food and eating? No   Do you have difficulty bathing yourself, getting dressed or grooming yourself? No   Do you have difficulty using the toilet? No   Do you have difficulty moving around from place to place? No   Do you have trouble with steps or getting out of a bed or a chair? No   Current Diet Well Balanced Diet   Dental Exam Up to date   Eye Exam Not up to date   Exercise (times per week) 4 times per week   Current Exercises Include Walking;Yard Work   Do you need help using the phone?  No   Are you deaf or do you have serious difficulty hearing?  Yes   Do you need help with transportation? No   Do you need help shopping? No   Do you need help preparing meals?  No   Do you need help with housework?  No   Do you need help with laundry? No   Do you need help taking your medications? No   Do you need help managing money? No   Do you ever drive or ride in a car without wearing a seat belt? No   Have you felt unusual stress, anger or loneliness in the last month? No   Who do you live with? Spouse   If you need help, do you have trouble finding someone available to you? No   Have you been bothered in the last four weeks by sexual problems? No   Do you have difficulty concentrating,  remembering or making decisions? No       Age-appropriate Screening Schedule:  Refer to the list below for future screening recommendations based on patient's age, sex and/or medical conditions. Orders for these recommended tests are listed in the plan section. The patient has been provided with a written plan.    Health Maintenance   Topic Date Due   • HEPATITIS C SCREENING  Never done   • LIPID PANEL  06/01/2023 (Originally 3/17/2023)   • AAA SCREEN (ONE-TIME)  06/01/2023 (Originally 9/16/2022)   • ZOSTER VACCINE (1 of 2) 06/01/2023 (Originally 4/5/2002)   • COVID-19 Vaccine (1) 06/03/2023 (Originally 1950)   • COLORECTAL CANCER SCREENING  06/08/2023 (Originally 1950)   • Pneumococcal Vaccine 65+ (1 - PCV) 06/01/2024 (Originally 4/5/1956)   • INFLUENZA VACCINE  08/01/2023   • ANNUAL WELLNESS VISIT  06/01/2024   • TDAP/TD VACCINES (2 - Td or Tdap) 04/22/2029                  CMS Preventative Services Quick Reference  Risk Factors Identified During Encounter  None Identified  The above risks/problems have been discussed with the patient.  Pertinent information has been shared with the patient in the After Visit Summary.  An After Visit Summary and PPPS were made available to the patient.    Follow Up:   Next Medicare Wellness visit to be scheduled in 1 year.       Additional E&M Note during same encounter follows:  Patient has multiple medical problems which are significant and separately identifiable that require additional work above and beyond the Medicare Wellness Visit.      Chief Complaint  Medicare Wellness-subsequent, Cough, Nasal Congestion (Since 04/2023), and Wheezing (Since 04/2023)    Subjective        HPI  Collinsraman Brumfield is also being seen today for HLD, obesity, asthma and insomnia.     Review of Systems   HENT: Negative for trouble swallowing.    Eyes: Negative for visual disturbance.   Respiratory: Positive for cough, shortness of breath and wheezing. Negative for apnea.    Cardiovascular:  "Negative for chest pain.   Gastrointestinal: Negative for blood in stool.   Endocrine: Negative for polyphagia.   Genitourinary: Negative for dysuria.   Skin: Negative for color change.   Allergic/Immunologic: Negative for immunocompromised state.   Neurological: Negative for seizures.   Hematological: Negative for adenopathy.   Psychiatric/Behavioral: Negative for behavioral problems.       Objective   Vital Signs:  /66 (BP Location: Left arm, Patient Position: Sitting)   Pulse 69   Temp 97.3 °F (36.3 °C)   Resp 18   Ht 182.9 cm (72.01\")   Wt 103 kg (227 lb 14.4 oz)   SpO2 98%   BMI 30.90 kg/m²     Physical Exam  Vitals reviewed.   Constitutional:       Appearance: Normal appearance. He is well-developed. He is obese.   HENT:      Head: Normocephalic and atraumatic.      Right Ear: External ear normal.      Left Ear: External ear normal.      Mouth/Throat:      Pharynx: No oropharyngeal exudate.   Eyes:      Conjunctiva/sclera: Conjunctivae normal.      Pupils: Pupils are equal, round, and reactive to light.   Neck:      Vascular: No carotid bruit.   Cardiovascular:      Rate and Rhythm: Normal rate and regular rhythm.      Heart sounds: No murmur heard.    No friction rub. No gallop.   Pulmonary:      Effort: Pulmonary effort is normal.      Breath sounds: No rhonchi.   Abdominal:      General: There is no distension.   Skin:     General: Skin is warm and dry.   Neurological:      Mental Status: He is alert and oriented to person, place, and time.      Cranial Nerves: No cranial nerve deficit.      Motor: No weakness.   Psychiatric:         Mood and Affect: Mood and affect normal.         Behavior: Behavior normal.         Thought Content: Thought content normal.         Judgment: Judgment normal.            CMP        11/14/2022    11:40 11/14/2022    11:41   CMP   Glucose 102      BUN 12      Creatinine 1.24   1.20     EGFR 62.5   65.1     Sodium 140      Potassium 4.3      Chloride 106      Calcium " 9.5      Total Protein 7.4      Albumin 4.60      Globulin 2.8      Total Bilirubin 0.5      Alkaline Phosphatase 70      AST (SGOT) 23      ALT (SGPT) 23      Albumin/Globulin Ratio 1.6      BUN/Creatinine Ratio 9.7      Anion Gap 10.7        CBC        11/14/2022    11:40   CBC   WBC 7.02     RBC 5.02     Hemoglobin 15.6     Hematocrit 47.3     MCV 94.2     MCH 31.1     MCHC 33.0     RDW 12.8     Platelets 183                          Assessment and Plan   Diagnoses and all orders for this visit:    1. Medicare annual wellness visit, subsequent (Primary)    2. Moderate persistent asthma without complication  Assessment & Plan:  Asthma is worsening.  The patient is experiencing monthly daytime asthma symptoms. He is experiencing frequent nighttime asthma symptoms.  Medications: discontinue Advair. Start Breo once per day.         Orders:  -     Fluticasone Furoate-Vilanterol (Breo Ellipta) 100-25 MCG/ACT aerosol powder ; Inhale 1 puff Daily.  Dispense: 56 each; Refill: 0          -     azithromycin (Zithromax Z-Pramod) 250 MG tablet; Take 2 tablets by mouth on day 1, then 1 tablet daily on days 2-5  Dispense: 6 tablet; Refill: 0         -     predniSONE (DELTASONE) 20 MG tablet; Take 3 tablets by mouth Daily for 3 days, THEN 2 tablets Daily for 3 days, THEN 1 tablet Daily for 3 days.  Dispense: 18 tablet; Refill: 0    3. Male erectile disorder  -     tadalafil (Cialis) 20 MG tablet; Take 1 tablet by mouth Daily As Needed for Erectile Dysfunction.  Dispense: 90 tablet; Refill: 3    4. Class 1 obesity due to excess calories with serious comorbidity and body mass index (BMI) of 30.0 to 30.9 in adult  Assessment & Plan:  Patient's (Body mass index is 30.9 kg/m².) indicates that they are obese (BMI >30) with health conditions that include dyslipidemias . Weight is improving with lifestyle modifications. BMI  is above average; BMI management plan is completed. We discussed low calorie, low carb based diet program, portion  control and increasing exercise.       5. Primary insomnia  Assessment & Plan:  The patient's insomnia is currently well controlled with 10 mg of Ambien nightly.  He will be continued on this dosage and we will see him back at his next clinic appointment manage according to findings.      6. Need for hepatitis C screening test  -     Hepatitis C Antibody; Future    7. Pure hypercholesterolemia  Assessment & Plan:  Lipid abnormalities are improving with treatment.  Nutritional counseling was provided. and Pharmacotherapy as ordered.  Lipids will be reassessed in 6 months.           Follow Up   Return in about 6 months (around 12/1/2023).  Patient was given instructions and counseling regarding his condition or for health maintenance advice. Please see specific information pulled into the AVS if appropriate.

## 2023-06-06 ENCOUNTER — LAB (OUTPATIENT)
Dept: LAB | Facility: HOSPITAL | Age: 73
End: 2023-06-06
Payer: OTHER GOVERNMENT

## 2023-06-06 DIAGNOSIS — Z12.5 SCREENING FOR PROSTATE CANCER: ICD-10-CM

## 2023-06-06 DIAGNOSIS — R73.9 ELEVATED BLOOD SUGAR: ICD-10-CM

## 2023-06-06 DIAGNOSIS — E78.00 PURE HYPERCHOLESTEROLEMIA: Chronic | ICD-10-CM

## 2023-06-06 DIAGNOSIS — Z11.59 NEED FOR HEPATITIS C SCREENING TEST: ICD-10-CM

## 2023-06-06 DIAGNOSIS — Z00.00 ANNUAL PHYSICAL EXAM: ICD-10-CM

## 2023-06-06 LAB
BASOPHILS # BLD AUTO: 0.05 10*3/MM3 (ref 0–0.2)
BASOPHILS NFR BLD AUTO: 0.5 % (ref 0–1.5)
DEPRECATED RDW RBC AUTO: 42.8 FL (ref 37–54)
EOSINOPHIL # BLD AUTO: 0.08 10*3/MM3 (ref 0–0.4)
EOSINOPHIL NFR BLD AUTO: 0.8 % (ref 0.3–6.2)
ERYTHROCYTE [DISTWIDTH] IN BLOOD BY AUTOMATED COUNT: 12.4 % (ref 12.3–15.4)
HBA1C MFR BLD: 5.8 % (ref 4.8–5.6)
HCT VFR BLD AUTO: 40.8 % (ref 37.5–51)
HCV AB SER DONR QL: NORMAL
HGB BLD-MCNC: 13.7 G/DL (ref 13–17.7)
IMM GRANULOCYTES # BLD AUTO: 0.09 10*3/MM3 (ref 0–0.05)
IMM GRANULOCYTES NFR BLD AUTO: 0.9 % (ref 0–0.5)
LYMPHOCYTES # BLD AUTO: 3.61 10*3/MM3 (ref 0.7–3.1)
LYMPHOCYTES NFR BLD AUTO: 36.1 % (ref 19.6–45.3)
MCH RBC QN AUTO: 31.4 PG (ref 26.6–33)
MCHC RBC AUTO-ENTMCNC: 33.6 G/DL (ref 31.5–35.7)
MCV RBC AUTO: 93.4 FL (ref 79–97)
MONOCYTES # BLD AUTO: 0.85 10*3/MM3 (ref 0.1–0.9)
MONOCYTES NFR BLD AUTO: 8.5 % (ref 5–12)
NEUTROPHILS NFR BLD AUTO: 5.31 10*3/MM3 (ref 1.7–7)
NEUTROPHILS NFR BLD AUTO: 53.2 % (ref 42.7–76)
NRBC BLD AUTO-RTO: 0 /100 WBC (ref 0–0.2)
PLATELET # BLD AUTO: 163 10*3/MM3 (ref 140–450)
PMV BLD AUTO: 10.8 FL (ref 6–12)
RBC # BLD AUTO: 4.37 10*6/MM3 (ref 4.14–5.8)
WBC NRBC COR # BLD: 9.99 10*3/MM3 (ref 3.4–10.8)

## 2023-06-06 PROCEDURE — 36415 COLL VENOUS BLD VENIPUNCTURE: CPT

## 2023-06-06 PROCEDURE — 86803 HEPATITIS C AB TEST: CPT

## 2023-06-06 PROCEDURE — 80053 COMPREHEN METABOLIC PANEL: CPT

## 2023-06-06 PROCEDURE — 84443 ASSAY THYROID STIM HORMONE: CPT

## 2023-06-06 PROCEDURE — 83036 HEMOGLOBIN GLYCOSYLATED A1C: CPT

## 2023-06-06 PROCEDURE — 85025 COMPLETE CBC W/AUTO DIFF WBC: CPT

## 2023-06-06 PROCEDURE — G0103 PSA SCREENING: HCPCS

## 2023-06-06 PROCEDURE — 81001 URINALYSIS AUTO W/SCOPE: CPT

## 2023-06-06 PROCEDURE — 80061 LIPID PANEL: CPT

## 2023-06-07 LAB
ALBUMIN SERPL-MCNC: 4.2 G/DL (ref 3.5–5.2)
ALBUMIN/GLOB SERPL: 1.8 G/DL
ALP SERPL-CCNC: 49 U/L (ref 39–117)
ALT SERPL W P-5'-P-CCNC: 18 U/L (ref 1–41)
ANION GAP SERPL CALCULATED.3IONS-SCNC: 9.5 MMOL/L (ref 5–15)
AST SERPL-CCNC: 18 U/L (ref 1–40)
BACTERIA UR QL AUTO: NORMAL /HPF
BILIRUB SERPL-MCNC: 0.4 MG/DL (ref 0–1.2)
BILIRUB UR QL STRIP: NEGATIVE
BUN SERPL-MCNC: 16 MG/DL (ref 8–23)
BUN/CREAT SERPL: 15.1 (ref 7–25)
CALCIUM SPEC-SCNC: 9.5 MG/DL (ref 8.6–10.5)
CHLORIDE SERPL-SCNC: 106 MMOL/L (ref 98–107)
CHOLEST SERPL-MCNC: 141 MG/DL (ref 0–200)
CLARITY UR: CLEAR
CO2 SERPL-SCNC: 27.5 MMOL/L (ref 22–29)
COD CRY URNS QL: NORMAL /HPF
COLOR UR: YELLOW
CREAT SERPL-MCNC: 1.06 MG/DL (ref 0.76–1.27)
EGFRCR SERPLBLD CKD-EPI 2021: 75 ML/MIN/1.73
GLOBULIN UR ELPH-MCNC: 2.4 GM/DL
GLUCOSE SERPL-MCNC: 88 MG/DL (ref 65–99)
GLUCOSE UR STRIP-MCNC: NEGATIVE MG/DL
HDLC SERPL-MCNC: 60 MG/DL (ref 40–60)
HGB UR QL STRIP.AUTO: NEGATIVE
HYALINE CASTS UR QL AUTO: NORMAL /LPF
KETONES UR QL STRIP: ABNORMAL
LDLC SERPL CALC-MCNC: 66 MG/DL (ref 0–100)
LDLC/HDLC SERPL: 1.1 {RATIO}
LEUKOCYTE ESTERASE UR QL STRIP.AUTO: NEGATIVE
NITRITE UR QL STRIP: NEGATIVE
PH UR STRIP.AUTO: 6.5 [PH] (ref 5–8)
POTASSIUM SERPL-SCNC: 4.2 MMOL/L (ref 3.5–5.2)
PROT SERPL-MCNC: 6.6 G/DL (ref 6–8.5)
PROT UR QL STRIP: ABNORMAL
PSA SERPL-MCNC: 1.3 NG/ML (ref 0–4)
RBC # UR STRIP: NORMAL /HPF
REF LAB TEST METHOD: NORMAL
SODIUM SERPL-SCNC: 143 MMOL/L (ref 136–145)
SP GR UR STRIP: 1.03 (ref 1–1.03)
SQUAMOUS #/AREA URNS HPF: NORMAL /HPF
TRIGL SERPL-MCNC: 76 MG/DL (ref 0–150)
TSH SERPL DL<=0.05 MIU/L-ACNC: 3.12 UIU/ML (ref 0.27–4.2)
UROBILINOGEN UR QL STRIP: ABNORMAL
VLDLC SERPL-MCNC: 15 MG/DL (ref 5–40)
WBC # UR STRIP: NORMAL /HPF

## 2023-08-03 ENCOUNTER — OFFICE VISIT (OUTPATIENT)
Dept: FAMILY MEDICINE CLINIC | Facility: CLINIC | Age: 73
End: 2023-08-03
Payer: MEDICARE

## 2023-08-03 VITALS
TEMPERATURE: 97.1 F | HEART RATE: 70 BPM | WEIGHT: 230.4 LBS | OXYGEN SATURATION: 98 % | HEIGHT: 72 IN | BODY MASS INDEX: 31.21 KG/M2 | DIASTOLIC BLOOD PRESSURE: 74 MMHG | SYSTOLIC BLOOD PRESSURE: 122 MMHG | RESPIRATION RATE: 18 BRPM

## 2023-08-03 DIAGNOSIS — R73.9 ELEVATED BLOOD SUGAR: ICD-10-CM

## 2023-08-03 DIAGNOSIS — E66.09 CLASS 1 OBESITY DUE TO EXCESS CALORIES WITH SERIOUS COMORBIDITY AND BODY MASS INDEX (BMI) OF 31.0 TO 31.9 IN ADULT: ICD-10-CM

## 2023-08-03 DIAGNOSIS — F51.01 PRIMARY INSOMNIA: Chronic | ICD-10-CM

## 2023-08-03 DIAGNOSIS — F33.1 MODERATE EPISODE OF RECURRENT MAJOR DEPRESSIVE DISORDER: Primary | ICD-10-CM

## 2023-08-03 PROBLEM — Z00.00 MEDICARE ANNUAL WELLNESS VISIT, SUBSEQUENT: Status: RESOLVED | Noted: 2023-06-01 | Resolved: 2023-08-03

## 2023-08-03 RX ORDER — DONEPEZIL HYDROCHLORIDE 10 MG/1
10 TABLET, FILM COATED ORAL DAILY
COMMUNITY

## 2023-08-03 RX ORDER — TEMAZEPAM 15 MG/1
CAPSULE ORAL
Qty: 60 CAPSULE | Refills: 2 | Status: SHIPPED | OUTPATIENT
Start: 2023-08-03

## 2023-08-03 RX ORDER — TADALAFIL 20 MG/1
TABLET ORAL DAILY
COMMUNITY

## 2023-08-03 RX ORDER — ASPIRIN 81 MG/1
81 TABLET, CHEWABLE ORAL DAILY
COMMUNITY

## 2023-08-17 ENCOUNTER — OFFICE VISIT (OUTPATIENT)
Dept: FAMILY MEDICINE CLINIC | Facility: CLINIC | Age: 73
End: 2023-08-17
Payer: MEDICARE

## 2023-08-17 VITALS
BODY MASS INDEX: 31.75 KG/M2 | RESPIRATION RATE: 18 BRPM | WEIGHT: 234.4 LBS | DIASTOLIC BLOOD PRESSURE: 66 MMHG | HEIGHT: 72 IN | HEART RATE: 73 BPM | OXYGEN SATURATION: 97 % | TEMPERATURE: 97.3 F | SYSTOLIC BLOOD PRESSURE: 119 MMHG

## 2023-08-17 DIAGNOSIS — F51.01 PRIMARY INSOMNIA: Chronic | ICD-10-CM

## 2023-08-17 DIAGNOSIS — E66.09 CLASS 1 OBESITY DUE TO EXCESS CALORIES WITH SERIOUS COMORBIDITY AND BODY MASS INDEX (BMI) OF 31.0 TO 31.9 IN ADULT: ICD-10-CM

## 2023-08-17 DIAGNOSIS — F33.1 MODERATE EPISODE OF RECURRENT MAJOR DEPRESSIVE DISORDER: Primary | Chronic | ICD-10-CM

## 2023-08-17 DIAGNOSIS — Z51.81 MEDICATION MONITORING ENCOUNTER: ICD-10-CM

## 2023-08-17 LAB
POC AMPHETAMINES: NEGATIVE
POC BARBITURATES: NEGATIVE
POC BENZODIAZEPHINES: POSITIVE
POC COCAINE: NEGATIVE
POC METHADONE: NEGATIVE
POC METHAMPHETAMINE SCREEN URINE: NEGATIVE
POC OPIATES: NEGATIVE
POC OXYCODONE: NEGATIVE
POC PHENCYCLIDINE: NEGATIVE
POC PROPOXYPHENE: NEGATIVE
POC THC: NEGATIVE
POC TRICYCLIC ANTIDEPRESSANTS: NEGATIVE

## 2023-08-17 RX ORDER — DESVENLAFAXINE SUCCINATE 50 MG/1
50 TABLET, EXTENDED RELEASE ORAL DAILY
Qty: 30 TABLET | Refills: 2 | Status: SHIPPED | OUTPATIENT
Start: 2023-08-17

## 2023-08-17 NOTE — PROGRESS NOTES
"Chief Complaint  Results (Go over gene sight test) and Depression    Subjective        Collins Brumfield presents to [facility]  History of Present Illness  He is here today for an acute visit as well as management of his chronic medical conditions..  He has asthma, hyperlipidemia, vitamin D deficiency, insomnia, sleep apnea and polyneuropathy.  [facility]     His sleep is better with restoril 15 mg nightly.      He is still feeling down today. He was in [place] and has PTSD.      The patient has no other complaints today and denies chest pain, shortness of breath, weakness, numbness, nausea, vomiting, diarrhea, dizziness or syncopal event.      Objective   Vital Signs:  /66 (BP Location: Left arm, Patient Position: Sitting, Cuff Size: Adult)   Pulse 73   Temp 97.3 øF (36.3 øC) (Tympanic)   Resp 18   Ht 182.9 cm (72.01\")   Wt 106 kg (234 lb 6.4 oz)   SpO2 97%   BMI 31.78 kg/mý   Estimated body mass index is 31.78 kg/mý as calculated from the following:    Height as of this encounter: 182.9 cm (72.01\").    Weight as of this encounter: 106 kg (234 lb 6.4 oz).               Physical Exam  Vitals reviewed.   Constitutional:       Appearance: He is well-developed. He is obese.   HENT:      Head: Normocephalic and atraumatic.      Right Ear: External ear normal.      Left Ear: External ear normal.      Mouth/Throat:      Pharynx: No oropharyngeal exudate.   Eyes:      Conjunctiva/sclera: Conjunctivae normal.      Pupils: Pupils are equal, round, and reactive to light.   Neck:      Vascular: No carotid bruit.   Cardiovascular:      Rate and Rhythm: Normal rate and regular rhythm.      Heart sounds: No murmur heard.    No friction rub. No gallop.   Pulmonary:      Effort: Pulmonary effort is normal.      Breath sounds: Normal breath sounds. No wheezing or rhonchi.   Abdominal:      General: There is no distension.   Skin:     General: Skin is warm and dry. "   Neurological:      Mental Status: He is alert and oriented to person, place, and time.      Cranial Nerves: No cranial nerve deficit.      Motor: No weakness.   Psychiatric:         Mood and Affect: Mood and affect normal.         Behavior: Behavior normal.         Thought Content: Thought content normal.         Judgment: Judgment normal.      Result Review :    CMP          11/14/2022    11:40 11/14/2022    11:41 6/6/2023    11:10   CMP   Glucose 102   88    BUN 12   16    Creatinine 1.24  1.20  1.06    EGFR 62.5  65.1  75.0    Sodium 140   143    Potassium 4.3   4.2    Chloride 106   106    Calcium 9.5   9.5    Total Protein 7.4   6.6    Albumin 4.60   4.2    Globulin 2.8   2.4    Total Bilirubin 0.5   0.4    Alkaline Phosphatase 70   49    AST (SGOT) 23   18    ALT (SGPT) 23   18    Albumin/Globulin Ratio 1.6   1.8    BUN/Creatinine Ratio 9.7   15.1    Anion Gap 10.7   9.5      CBC          11/14/2022    11:40 6/6/2023    11:10   CBC   WBC 7.02  9.99    RBC 5.02  4.37    Hemoglobin 15.6  13.7    Hematocrit 47.3  40.8    MCV 94.2  93.4    MCH 31.1  31.4    MCHC 33.0  33.6    RDW 12.8  12.4    Platelets 183  163      Lipid Panel          6/6/2023    11:10   Lipid Panel   Total Cholesterol 141    Triglycerides 76    HDL Cholesterol 60    VLDL Cholesterol 15    LDL Cholesterol  66    LDL/HDL Ratio 1.10      TSH          6/6/2023    11:10   TSH   TSH 3.120                   Assessment and Plan   Diagnoses and all orders for this visit:    1. Moderate episode of recurrent major depressive disorder (Primary)  Assessment & Plan:  Patient's depression is recurrent and is moderate without psychosis. Their depression is currently active and the condition is worsening. This will be reassessed at the next regular appointment. F/U as described:patient was prescribed an antidepressant medicine.      2. Medication monitoring encounter  -     POC Urine Drug Screen, Triage    3. Primary insomnia  Assessment & Plan:  He is doing  well on Restoril 15 mg nightly. He will be continued on this dose and we will see him back in 6 months and manage according to findings.       4. Class 1 obesity due to excess calories with serious comorbidity and body mass index (BMI) of 31.0 to 31.9 in adult  Assessment & Plan:  Patient's (Body mass index is 31.78 kg/mý.) indicates that they are morbidly/severely obese (BMI > 40 or > 35 with obesity - related health condition) with health conditions that include hypertension and dyslipidemias . Weight is improving with treatment. BMI  is above average; BMI management plan is completed. We discussed low calorie, low carb based diet program, portion control, and increasing exercise.       Other orders  -     desvenlafaxine (Pristiq) 50 MG 24 hr tablet; Take 1 tablet by mouth Daily.  Dispense: 30 tablet; Refill: 2             Follow Up   Return in about 6 weeks (around 9/28/2023).  Patient was given instructions and counseling regarding his condition or for health maintenance advice. Please see specific information pulled into the AVS if appropriate.       Answers submitted by the patient for this visit:  Primary Reason for Visit (Submitted on 8/10/2023)  What is the primary reason for your visit?: Other  Other (Submitted on 8/10/2023)  Please describe your symptoms.: Insomnia follow up  Have you had these symptoms before?: Yes  How long have you been having these symptoms?: Greater than 2 weeks  Please list any medications you are currently taking for this condition.: Temazepam

## 2023-08-17 NOTE — ASSESSMENT & PLAN NOTE
He is doing well on Restoril 15 mg nightly. He will be continued on this dose and we will see him back in 6 months and manage according to findings.

## 2023-09-28 ENCOUNTER — OFFICE VISIT (OUTPATIENT)
Dept: FAMILY MEDICINE CLINIC | Facility: CLINIC | Age: 73
End: 2023-09-28
Payer: MEDICARE

## 2023-09-28 VITALS
DIASTOLIC BLOOD PRESSURE: 75 MMHG | BODY MASS INDEX: 31.77 KG/M2 | HEIGHT: 72 IN | HEART RATE: 63 BPM | OXYGEN SATURATION: 95 % | SYSTOLIC BLOOD PRESSURE: 122 MMHG | TEMPERATURE: 97.5 F | WEIGHT: 234.6 LBS | RESPIRATION RATE: 18 BRPM

## 2023-09-28 DIAGNOSIS — E66.09 CLASS 1 OBESITY DUE TO EXCESS CALORIES WITH SERIOUS COMORBIDITY AND BODY MASS INDEX (BMI) OF 31.0 TO 31.9 IN ADULT: ICD-10-CM

## 2023-09-28 DIAGNOSIS — F51.01 PRIMARY INSOMNIA: Chronic | ICD-10-CM

## 2023-09-28 DIAGNOSIS — F33.1 MODERATE EPISODE OF RECURRENT MAJOR DEPRESSIVE DISORDER: Primary | Chronic | ICD-10-CM

## 2023-09-28 PROCEDURE — 99214 OFFICE O/P EST MOD 30 MIN: CPT | Performed by: FAMILY MEDICINE

## 2023-09-28 RX ORDER — DESVENLAFAXINE SUCCINATE 50 MG/1
50 TABLET, EXTENDED RELEASE ORAL DAILY
Qty: 90 TABLET | Refills: 1 | Status: SHIPPED | OUTPATIENT
Start: 2023-09-28

## 2023-09-28 RX ORDER — TEMAZEPAM 30 MG/1
30 CAPSULE ORAL NIGHTLY PRN
Qty: 90 CAPSULE | Refills: 1
Start: 2023-09-28 | End: 2024-03-26

## 2023-09-28 NOTE — PROGRESS NOTES
"Chief Complaint  Depression and Insomnia    Subjective        Collins Brumfield presents to [facility]  History of Present Illness  He is here today for an acute visit as well as management of his chronic medical conditions..  He has asthma, hyperlipidemia, vitamin D deficiency, insomnia, sleep apnea and polyneuropathy.  [facility] He was [place] and has PTSD.      His sleep is better with restoril 30 mg nightly.      He is tolerating Pristiq 50 mg daily and his depression and anxiety symptoms are improving.      The patient has no other complaints today and denies chest pain, shortness of breath, weakness, numbness, nausea, vomiting, diarrhea, dizziness or syncopal event.      Objective   Vital Signs:  /75 (BP Location: Left arm, Patient Position: Sitting, Cuff Size: Adult)   Pulse 63   Temp 97.5 °F (36.4 °C) (Tympanic)   Resp 18   Ht 182.9 cm (72.01\")   Wt 106 kg (234 lb 9.6 oz)   SpO2 95%   BMI 31.81 kg/m²   Estimated body mass index is 31.81 kg/m² as calculated from the following:    Height as of this encounter: 182.9 cm (72.01\").    Weight as of this encounter: 106 kg (234 lb 9.6 oz).               Physical Exam  Vitals reviewed.   Constitutional:       Appearance: He is well-developed. He is obese.   HENT:      Head: Normocephalic and atraumatic.      Right Ear: External ear normal.      Left Ear: External ear normal.      Mouth/Throat:      Pharynx: No oropharyngeal exudate.   Eyes:      Conjunctiva/sclera: Conjunctivae normal.      Pupils: Pupils are equal, round, and reactive to light.   Neck:      Vascular: No carotid bruit.   Cardiovascular:      Rate and Rhythm: Normal rate and regular rhythm.      Heart sounds: No murmur heard.    No friction rub. No gallop.   Pulmonary:      Effort: Pulmonary effort is normal.      Breath sounds: Normal breath sounds. No wheezing or rhonchi.   Abdominal:      General: There is no distension. "   Skin:     General: Skin is warm and dry.   Neurological:      Mental Status: He is alert and oriented to person, place, and time.      Cranial Nerves: No cranial nerve deficit.      Motor: No weakness.   Psychiatric:         Mood and Affect: Mood and affect normal.         Behavior: Behavior normal.         Thought Content: Thought content normal.         Judgment: Judgment normal.      Result Review :    CMP          11/14/2022    11:40 11/14/2022    11:41 6/6/2023    11:10   CMP   Glucose 102   88    BUN 12   16    Creatinine 1.24  1.20  1.06    EGFR 62.5  65.1  75.0    Sodium 140   143    Potassium 4.3   4.2    Chloride 106   106    Calcium 9.5   9.5    Total Protein 7.4   6.6    Albumin 4.60   4.2    Globulin 2.8   2.4    Total Bilirubin 0.5   0.4    Alkaline Phosphatase 70   49    AST (SGOT) 23   18    ALT (SGPT) 23   18    Albumin/Globulin Ratio 1.6   1.8    BUN/Creatinine Ratio 9.7   15.1    Anion Gap 10.7   9.5      CBC          11/14/2022    11:40 6/6/2023    11:10   CBC   WBC 7.02  9.99    RBC 5.02  4.37    Hemoglobin 15.6  13.7    Hematocrit 47.3  40.8    MCV 94.2  93.4    MCH 31.1  31.4    MCHC 33.0  33.6    RDW 12.8  12.4    Platelets 183  163      Lipid Panel          6/6/2023    11:10   Lipid Panel   Total Cholesterol 141    Triglycerides 76    HDL Cholesterol 60    VLDL Cholesterol 15    LDL Cholesterol  66    LDL/HDL Ratio 1.10      TSH          6/6/2023    11:10   TSH   TSH 3.120                   Assessment and Plan   Diagnoses and all orders for this visit:    1. Moderate episode of recurrent major depressive disorder (Primary)  Assessment & Plan:  Patient's depression is recurrent and is moderate without psychosis. Their depression is currently active and the condition is improving with treatment. This will be reassessed at the next regular appointment. F/U as described:patient will continue current medication therapy.    Orders:  -     temazepam (RESTORIL) 30 MG capsule; Take 1 capsule by  mouth At Night As Needed for Sleep for up to 180 days.  Dispense: 90 capsule; Refill: 1    2. Primary insomnia  Comments:  The patient's primary insomnia is improving with Restoril 30 mg nightly.  He will be continued on this medication we will see him back at his next visit.    3. Class 1 obesity due to excess calories with serious comorbidity and body mass index (BMI) of 31.0 to 31.9 in adult  Assessment & Plan:  Patient's (Body mass index is 31.81 kg/m².) indicates that they are obese (BMI >30) with health conditions that include dyslipidemias . Weight is unchanged. BMI  is above average; BMI management plan is completed. We discussed low calorie, low carb based diet program, portion control, and increasing exercise.       Other orders  -     desvenlafaxine (Pristiq) 50 MG 24 hr tablet; Take 1 tablet by mouth Daily.  Dispense: 90 tablet; Refill: 1             Follow Up   Return in about 6 months (around 3/28/2024).  Patient was given instructions and counseling regarding his condition or for health maintenance advice. Please see specific information pulled into the AVS if appropriate.       Answers submitted by the patient for this visit:  Primary Reason for Visit (Submitted on 9/23/2023)  What is the primary reason for your visit?: Other  Other (Submitted on 9/23/2023)  Please describe your symptoms.: Follow up  Have you had these symptoms before?: Yes  How long have you been having these symptoms?: 1-2 weeks

## 2023-09-28 NOTE — ASSESSMENT & PLAN NOTE
Patient's (Body mass index is 31.81 kg/m².) indicates that they are obese (BMI >30) with health conditions that include dyslipidemias . Weight is unchanged. BMI  is above average; BMI management plan is completed. We discussed low calorie, low carb based diet program, portion control, and increasing exercise.

## 2023-11-22 DIAGNOSIS — F33.1 MODERATE EPISODE OF RECURRENT MAJOR DEPRESSIVE DISORDER: Chronic | ICD-10-CM

## 2023-11-22 RX ORDER — TEMAZEPAM 30 MG/1
30 CAPSULE ORAL NIGHTLY PRN
Qty: 90 CAPSULE | Refills: 1 | Status: SHIPPED | OUTPATIENT
Start: 2023-11-22 | End: 2024-05-20

## 2023-11-22 RX ORDER — TEMAZEPAM 30 MG/1
30 CAPSULE ORAL NIGHTLY PRN
Qty: 90 CAPSULE | Refills: 1
Start: 2023-11-22 | End: 2023-11-22 | Stop reason: SDUPTHER

## 2023-12-01 ENCOUNTER — OFFICE VISIT (OUTPATIENT)
Dept: FAMILY MEDICINE CLINIC | Facility: CLINIC | Age: 73
End: 2023-12-01
Payer: MEDICARE

## 2023-12-01 VITALS
DIASTOLIC BLOOD PRESSURE: 82 MMHG | SYSTOLIC BLOOD PRESSURE: 119 MMHG | BODY MASS INDEX: 32.37 KG/M2 | TEMPERATURE: 98.2 F | HEIGHT: 72 IN | OXYGEN SATURATION: 94 % | RESPIRATION RATE: 18 BRPM | WEIGHT: 239 LBS | HEART RATE: 73 BPM

## 2023-12-01 DIAGNOSIS — F51.01 PRIMARY INSOMNIA: Chronic | ICD-10-CM

## 2023-12-01 DIAGNOSIS — F33.1 MODERATE EPISODE OF RECURRENT MAJOR DEPRESSIVE DISORDER: Primary | Chronic | ICD-10-CM

## 2023-12-01 DIAGNOSIS — E66.09 CLASS 1 OBESITY DUE TO EXCESS CALORIES WITH SERIOUS COMORBIDITY AND BODY MASS INDEX (BMI) OF 32.0 TO 32.9 IN ADULT: Chronic | ICD-10-CM

## 2023-12-01 PROCEDURE — 99214 OFFICE O/P EST MOD 30 MIN: CPT | Performed by: FAMILY MEDICINE

## 2023-12-01 PROCEDURE — 1159F MED LIST DOCD IN RCRD: CPT | Performed by: FAMILY MEDICINE

## 2023-12-01 PROCEDURE — 1160F RVW MEDS BY RX/DR IN RCRD: CPT | Performed by: FAMILY MEDICINE

## 2023-12-01 NOTE — PROGRESS NOTES
"Chief Complaint  Depression and Insomnia    Subjective        Collins Brumfield presents to [facility]  History of Present Illness  He is here today for management of his chronic medical conditions..  He has asthma, hyperlipidemia, vitamin D deficiency, insomnia, sleep apnea and polyneuropathy.  [facility]. He was in [place] and has PTSD.      His sleep is better with restoril 30 mg nightly.      He is tolerating Pristiq 50 mg daily and his depression and anxiety symptoms are improving.      The patient has no other complaints today and denies chest pain, shortness of breath, weakness, numbness, nausea, vomiting, diarrhea, dizziness or syncopal event.        Objective   Vital Signs:  /82 (BP Location: Left arm, Patient Position: Sitting, Cuff Size: Adult)   Pulse 73   Temp 98.2 °F (36.8 °C) (Tympanic)   Resp 18   Ht 182.9 cm (72.01\")   Wt 108 kg (239 lb)   SpO2 94%   BMI 32.41 kg/m²   Estimated body mass index is 32.41 kg/m² as calculated from the following:    Height as of this encounter: 182.9 cm (72.01\").    Weight as of this encounter: 108 kg (239 lb).               Physical Exam  Vitals reviewed.   Constitutional:       Appearance: He is well-developed. He is obese.   HENT:      Head: Normocephalic and atraumatic.      Right Ear: External ear normal.      Left Ear: External ear normal.      Mouth/Throat:      Pharynx: No oropharyngeal exudate.   Eyes:      Conjunctiva/sclera: Conjunctivae normal.      Pupils: Pupils are equal, round, and reactive to light.   Neck:      Vascular: No carotid bruit.   Cardiovascular:      Rate and Rhythm: Normal rate and regular rhythm.      Heart sounds: No murmur heard.     No friction rub. No gallop.   Pulmonary:      Effort: Pulmonary effort is normal.      Breath sounds: Normal breath sounds. No wheezing or rhonchi.   Abdominal:      General: There is no distension.   Skin:     General: Skin is warm and dry. "   Neurological:      Mental Status: He is alert and oriented to person, place, and time.      Cranial Nerves: No cranial nerve deficit.      Motor: No weakness.   Psychiatric:         Mood and Affect: Mood and affect normal.         Behavior: Behavior normal.         Thought Content: Thought content normal.         Judgment: Judgment normal.        Result Review :    CMP          6/6/2023    11:10   CMP   Glucose 88    BUN 16    Creatinine 1.06    EGFR 75.0    Sodium 143    Potassium 4.2    Chloride 106    Calcium 9.5    Total Protein 6.6    Albumin 4.2    Globulin 2.4    Total Bilirubin 0.4    Alkaline Phosphatase 49    AST (SGOT) 18    ALT (SGPT) 18    Albumin/Globulin Ratio 1.8    BUN/Creatinine Ratio 15.1    Anion Gap 9.5      CBC          6/6/2023    11:10   CBC   WBC 9.99    RBC 4.37    Hemoglobin 13.7    Hematocrit 40.8    MCV 93.4    MCH 31.4    MCHC 33.6    RDW 12.4    Platelets 163      Lipid Panel          6/6/2023    11:10   Lipid Panel   Total Cholesterol 141    Triglycerides 76    HDL Cholesterol 60    VLDL Cholesterol 15    LDL Cholesterol  66    LDL/HDL Ratio 1.10      TSH          6/6/2023    11:10   TSH   TSH 3.120                   Assessment and Plan   Diagnoses and all orders for this visit:    1. Moderate episode of recurrent major depressive disorder (Primary)  Assessment & Plan:  Patient's depression is recurrent and is moderate without psychosis. Their depression is currently active and the condition is improving with treatment. This will be reassessed at the next regular appointment. F/U as described:patient will continue current medication therapy.      2. Class 1 obesity due to excess calories with serious comorbidity and body mass index (BMI) of 32.0 to 32.9 in adult  Assessment & Plan:  Patient's (Body mass index is 32.41 kg/m².) indicates that they are obese (BMI >30) with health conditions that include hypertension and dyslipidemias . Weight is worsening. BMI  is above average; BMI  management plan is completed. We discussed low calorie, low carb based diet program, portion control, and increasing exercise.       3. Primary insomnia  Assessment & Plan:  The patient is currently doing well on Restoril 30 mg nightly.  Will see him back at his next clinic appointment and reevaluate his insomnia at that time.  I feel he is a low risk patient.  A UDS and controlled medication contract are in the chart.  Lino was reviewed today and is appropriate.               Follow Up   Return in about 6 months (around 6/1/2024).  Patient was given instructions and counseling regarding his condition or for health maintenance advice. Please see specific information pulled into the AVS if appropriate.         Answers submitted by the patient for this visit:  Primary Reason for Visit (Submitted on 11/25/2023)  What is the primary reason for your visit?: Other  Other (Submitted on 11/25/2023)  Please describe your symptoms.: 6 month check up  Have you had these symptoms before?: No  How long have you been having these symptoms?: 1-4 days  Please list any medications you are currently taking for this condition.: There is not an option for 6 month check-up.

## 2023-12-02 NOTE — ASSESSMENT & PLAN NOTE
The patient is currently doing well on Restoril 30 mg nightly.  Will see him back at his next clinic appointment and reevaluate his insomnia at that time.  I feel he is a low risk patient.  A UDS and controlled medication contract are in the chart.  Lino was reviewed today and is appropriate.

## 2023-12-07 ENCOUNTER — PATIENT ROUNDING (BHMG ONLY) (OUTPATIENT)
Dept: FAMILY MEDICINE CLINIC | Facility: CLINIC | Age: 73
End: 2023-12-07
Payer: OTHER GOVERNMENT

## 2023-12-07 NOTE — PROGRESS NOTES
A My-Chart message has been [place] to the [place] [facility] [place]

## 2023-12-13 ENCOUNTER — OFFICE VISIT (OUTPATIENT)
Dept: PODIATRY | Facility: CLINIC | Age: 73
End: 2023-12-13
Payer: MEDICARE

## 2023-12-13 VITALS
TEMPERATURE: 97.1 F | BODY MASS INDEX: 32.37 KG/M2 | DIASTOLIC BLOOD PRESSURE: 78 MMHG | HEIGHT: 72 IN | SYSTOLIC BLOOD PRESSURE: 126 MMHG | HEART RATE: 77 BPM | WEIGHT: 239 LBS | OXYGEN SATURATION: 94 %

## 2023-12-13 DIAGNOSIS — M79.671 FOOT PAIN, RIGHT: Primary | ICD-10-CM

## 2023-12-13 DIAGNOSIS — S92.514A CLOSED NONDISPLACED FRACTURE OF PROXIMAL PHALANX OF LESSER TOE OF RIGHT FOOT, INITIAL ENCOUNTER: ICD-10-CM

## 2023-12-13 NOTE — PROGRESS NOTES
[facility] - PODIATRY    Today's Date: 12/13/23    Patient Name: Collins Brumfield  MRN: [id number]  CSN: [id number]  PCP: Martha Alba DO  Referring Provider: Akhil Helms MD    SUBJECTIVE     Chief Complaint   Patient presents with    Right Foot - Pain, Establish Care     Injured 4th toe 12/8/23  when he kicked the corner of the bed  Paulie at  exam xrays placed in boot and referred here  Previous surgery by Dr Fletcher     HPI: Collins Brumfield, a 73 y.o.male, presents to clinic.    New, Established, New Problem: New    Location: Right fourth toe    Duration: 8 December 2023    Onset: Acute, hit it on the corner of a bed    Nature: Sore    Aggravating factors:  Patient relates pain is aggravated by shoe gear and ambulation.      Previous Treatment: Urgent care, x-ray, CAM Walker    Patient denies any fevers, chills, nausea, vomiting, shortness of breath, nor any other constitutional signs nor symptoms.    No other pedal complaints at this time.    Past Medical History:   Diagnosis Date    Aftercare following surgery 11/04/2020    Allergic     Allergic rhinitis due to allergen 04/30/2020    Asthma 04/30/2020    Cataract     COPD (chronic obstructive pulmonary disease)     Dizziness 11/2019    Erectile dysfunction 08/16/2020    Foot fracture, right     Fracture of nasal bones     Heel pain     HL (hearing loss)     Hyperlipidemia 02/25/2021    Ingrown toenail     Insomnia, unspecified 07/06/2019    Kidney stone     Lumbago 06/02/2016    low back pain    Lumbar spinal stenosis     Sinusitis     Sleep apnea 09/30/2019    Stroke 11/2019    Surgery follow-up 10/07/2020    Synovial cyst     unspecified    Tinnitus 11/2019     Past Surgical History:   Procedure Laterality Date    ADENOIDECTOMY      APPENDECTOMY      BACK SURGERY      BACK SURGERY      lumbar    8 years?    COLONOSCOPY  2018    normal    EYE SURGERY  2021    Cataracts    FOOT SURGERY Right     HAND SURGERY      INGUINAL HERNIA REPAIR       LUMBAR LAMINECTOMY  2016    L2-3    SINUS SURGERY      TONSILLECTOMY       Family History   Problem Relation Age of Onset    Cancer Father     Cancer Brother     Cancer Other         family history of cancer    Hyperlipidemia Brother      Social History     Socioeconomic History    Marital status:    Tobacco Use    Smoking status: Former     Packs/day: 1.00     Years: 2.00     Additional pack years: 0.00     Total pack years: 2.00     Types: Cigarettes, Pipe     Start date: 1969     Quit date: 1970     Years since quittin.9    Smokeless tobacco: Never    Tobacco comments:     As a teen   Vaping Use    Vaping Use: Never used   Substance and Sexual Activity    Alcohol use: Not Currently    Drug use: Never    Sexual activity: Yes     Partners: Female     No Known Allergies  Current Outpatient Medications   Medication Sig Dispense Refill    albuterol sulfate  (90 Base) MCG/ACT inhaler Inhale 2 puffs Every 4 (Four) Hours As Needed for Wheezing. 3 g 3    aspirin 81 MG chewable tablet Chew 1 tablet Daily.      desvenlafaxine (Pristiq) 50 MG 24 hr tablet Take 1 tablet by mouth Daily. 90 tablet 1    donepezil (ARICEPT) 10 MG tablet Take 1 tablet by mouth Daily.      fluticasone (Flonase) 50 MCG/ACT nasal spray 2 sprays into the nostril(s) as directed by provider Daily. 16 g 1    Fluticasone Furoate-Vilanterol (Breo Ellipta) 100-25 MCG/ACT aerosol powder  Inhale 1 puff Daily. 56 each 0    fluticasone-salmeterol (ADVAIR HFA) 115-21 MCG/ACT inhaler Inhale 2 puffs 2 (Two) Times a Day. 3 each 3    guaiFENesin (Mucinex) 600 MG 12 hr tablet Take 2 tablets by mouth 2 (Two) Times a Day. 14 tablet 0    ipratropium-albuterol (DUO-NEB) 0.5-2.5 mg/3 ml nebulizer Take 3 mL by nebulization Every 4 (Four) Hours As Needed for Wheezing.      montelukast (SINGULAIR) 10 MG tablet Take 1 tablet by mouth Every Night.      rosuvastatin (Crestor) 20 MG tablet Take 1 tablet by mouth Daily. 90 tablet 1    tadalafil  (Cialis) 20 MG tablet Take 1 tablet by mouth Daily As Needed for Erectile Dysfunction. 90 tablet 3    temazepam (RESTORIL) 30 MG capsule Take 1 capsule by mouth At Night As Needed for Sleep for up to 180 days. (Patient taking differently: Take 50 mg by mouth At Night As Needed for Sleep.) 90 capsule 1     No current facility-administered medications for this visit.     Review of Systems   Constitutional: Negative.    Musculoskeletal:         Broken right fourth toe   All other systems reviewed and are negative.      OBJECTIVE     Vitals:    12/13/23 1051   BP: 126/78   Pulse: 77   Temp: 97.1 °F (36.2 °C)   SpO2: 94%       WBC   Date Value Ref Range Status   06/06/2023 9.99 3.40 - 10.80 10*3/mm3 Final     RBC   Date Value Ref Range Status   06/06/2023 4.37 4.14 - 5.80 10*6/mm3 Final     Hemoglobin   Date Value Ref Range Status   06/06/2023 13.7 13.0 - 17.7 g/dL Final     Hematocrit   Date Value Ref Range Status   06/06/2023 40.8 37.5 - 51.0 % Final     MCV   Date Value Ref Range Status   06/06/2023 93.4 79.0 - 97.0 fL Final     MCH   Date Value Ref Range Status   06/06/2023 31.4 26.6 - 33.0 pg Final     MCHC   Date Value Ref Range Status   06/06/2023 33.6 31.5 - 35.7 g/dL Final     RDW   Date Value Ref Range Status   06/06/2023 12.4 12.3 - 15.4 % Final     RDW-SD   Date Value Ref Range Status   06/06/2023 42.8 37.0 - 54.0 fl Final     MPV   Date Value Ref Range Status   06/06/2023 10.8 6.0 - 12.0 fL Final     Platelets   Date Value Ref Range Status   06/06/2023 163 140 - 450 10*3/mm3 Final     Neutrophil %   Date Value Ref Range Status   06/06/2023 53.2 42.7 - 76.0 % Final     Lymphocyte %   Date Value Ref Range Status   06/06/2023 36.1 19.6 - 45.3 % Final     Monocyte %   Date Value Ref Range Status   06/06/2023 8.5 5.0 - 12.0 % Final     Eosinophil %   Date Value Ref Range Status   06/06/2023 0.8 0.3 - 6.2 % Final     Basophil %   Date Value Ref Range Status   06/06/2023 0.5 0.0 - 1.5 % Final     Immature Grans %    Date Value Ref Range Status   06/06/2023 0.9 (H) 0.0 - 0.5 % Final     Neutrophils, Absolute   Date Value Ref Range Status   06/06/2023 5.31 1.70 - 7.00 10*3/mm3 Final     Lymphocytes, Absolute   Date Value Ref Range Status   06/06/2023 3.61 (H) 0.70 - 3.10 10*3/mm3 Final     Monocytes, Absolute   Date Value Ref Range Status   06/06/2023 0.85 0.10 - 0.90 10*3/mm3 Final     Eosinophils, Absolute   Date Value Ref Range Status   06/06/2023 0.08 0.00 - 0.40 10*3/mm3 Final     Basophils, Absolute   Date Value Ref Range Status   06/06/2023 0.05 0.00 - 0.20 10*3/mm3 Final     Immature Grans, Absolute   Date Value Ref Range Status   06/06/2023 0.09 (H) 0.00 - 0.05 10*3/mm3 Final     nRBC   Date Value Ref Range Status   06/06/2023 0.0 0.0 - 0.2 /100 WBC Final         Lab Results   Component Value Date    GLUCOSE 88 06/06/2023    BUN 16 06/06/2023    CREATININE 1.06 06/06/2023    EGFR 75.0 06/06/2023    BCR 15.1 06/06/2023    K 4.2 06/06/2023    CO2 27.5 06/06/2023    CALCIUM 9.5 06/06/2023    ALBUMIN 4.2 06/06/2023    BILITOT 0.4 06/06/2023    AST 18 06/06/2023    ALT 18 06/06/2023       Patient seen in no apparent distress.      PHYSICAL EXAM:     Foot/Ankle Exam    GENERAL  Appearance:  appears stated age and elderly  Orientation:  AAOx3  Affect:  appropriate  Gait:  unimpaired  Assistance:  independent  Right shoe gear: CAM boot  Left shoe gear: casual shoe    VASCULAR     Right Foot Vascularity   Dorsalis pedis:  2+  Posterior tibial:  2+  Skin temperature:  warm  Edema grading:  None  CFT:  < 3 seconds  Pedal hair growth:  Present  Varicosities:  mild varicosities     Left Foot Vascularity   Dorsalis pedis:  2+  Posterior tibial:  2+  Skin temperature:  warm  Edema grading:  None  CFT:  < 3 seconds  Pedal hair growth:  Present  Varicosities:  mild varicosities     NEUROLOGIC     Right Foot Neurologic   Normal sensation    Light touch sensation: normal  Vibratory sensation: normal  Hot/Cold sensation:  normal  Protective Sensation using Yolyn-Oliva Monofilament:   Sites intact: 10  Sites tested: 10     Left Foot Neurologic   Normal sensation    Light touch sensation: normal  Vibratory sensation: normal  Hot/Cold sensation:  normal  Protective Sensation using Yolyn-Oliva Monofilament:   Sites intact: 10  Sites tested: 10    MUSCULOSKELETAL     Right Foot Musculoskeletal   Ecchymosis:  toe 5  Tenderness:  toe 5 tenderness      MUSCLE STRENGTH     Right Foot Muscle Strength   Foot dorsiflexion:  4  Foot plantar flexion:  4  Foot inversion:  4  Foot eversion:  4     Left Foot Muscle Strength   Foot dorsiflexion:  4  Foot plantar flexion:  4  Foot inversion:  4  Foot eversion:  4    RANGE OF MOTION     Right Foot Range of Motion   Foot and ankle ROM within normal limits       Left Foot Range of Motion   Foot and ankle ROM within normal limits      DERMATOLOGIC      Right Foot Dermatologic   Skin  Right foot skin is intact.      Left Foot Dermatologic   Skin  Left foot skin is intact.       RADIOLOGY:      XR Foot 3+ View Right    Result Date: 12/8/2023  Narrative: PROCEDURE: XR FOOT 3+ VW RIGHT  COMPARISON: [facility], , XR FOOT 3+ VW RIGHT, 6/09/2023, 15:00.  INDICATIONS: Injury to right fourth toe.  Bruising and swelling  FINDINGS:  There is a fracture of the 4th proximal phalangeal base which extends to the articular surface.  Alignment is near anatomic.  There is deformity of the 2nd metatarsal head articular surface, unchanged in the interval.  There appear to be postsurgical changes of the 1st metatarsal head, unchanged.  There has been previous plate and screw fixation of the 5th metatarsal shaft.  No persistent fracture line is identified.  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      Impression:   1. Intra-articular fracture of the 4th proximal phalangeal base in near anatomic alignment 2. Chronic deformity of the 2nd metatarsal head  possibly related to previous trauma or Freiberg's infraction. 3. Presumed previous bunionectomy involving the medial 1st metatarsal head. 4. 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 Previous ORIF 5th metatarsal shaft fracture which appears healed      Herman Early M.D.       Electronically Signed and Approved By: Herman Early M.D. on 12/08/2023 at 19:21              ASSESSMENT/PLAN     Diagnoses and all orders for this visit:    1. Foot pain, right (Primary)    2. Closed nondisplaced fracture of proximal phalanx of lesser toe of right foot, initial encounter    Comprehensive lower extremity examination and evaluation was performed.    Discussed findings and treatment plan including risks, benefits, and treatment options with patient in detail. Patient agreed with treatment plan.    Medications and allergies reviewed.  Reviewed available lab values along with other pertinent labs.  These were discussed with the patient.    Patient was shown how to properly buddy tape the fractured toe to the toe next to it with Coban.  Patient is to changes daily.  Patient is to discontinue when symptoms resolved.  The patient states understanding and agreement with this plan.    Rice Therapy: It is important to treat any injury as soon as possible to help control swelling and increase recovery time. The recognized regimen for immediate treatment of sport injuries includes rest, ice (cold application), compression, and elevation (RICE). Remove the injured athlete from play, apply ice to the affected area, wrap or compress the injured area with an elastic bandage when appropriate, and elevate the injured area above heart level to reduce swelling.  The patient is to not use ice for longer than 20 minutes at a time, with at least 20 minutes of no ice usage between applications.  The patient states understanding and agreement with this plan.    Patient instructed use OTC analgesics with dosing per package insert as needed.  Patient  states understanding and agreement with this plan.    Patient may begin to weight bear as tolerated in supportive shoes.  No impact activities for one month.  After that time, the patient may increase activities as tolerated.  Patient states understanding and agreement with this plan.    Patient is to monitor for recurrence and any new symptoms and to contact Dr. Fletcher's office for a follow-up appointment.      The patient states understanding and agreement with this plan.    An After Visit Summary was printed and given to the patient at discharge, including (if requested) any available informative/educational handouts regarding diagnosis, treatment, or medications. All questions were answered to patient/family satisfaction. Should symptoms fail to improve or worsen they agree to call or return to clinic or to go to the Emergency Department. Discussed the importance of following up with any needed screening tests/labs/specialist appointments and any requested follow-up recommended by me today. Importance of maintaining follow-up discussed and patient accepts that missed appointments can delay diagnosis and potentially lead to worsening of conditions.    Return if symptoms worsen or fail to improve., or sooner if acute issues arise.    This document has been electronically signed by Melvin Fletcher DPM on December 13, 2023 11:17 EST

## 2023-12-13 NOTE — LETTER
December 13, 2023       No Recipients    Patient: Collins Brumfield   YOB: 1950   Date of Visit: 12/13/2023       Dear Akhil Helms MD:    Thank you for referring Collins Brumfield to me for evaluation. Below are the relevant portions of my assessment and plan of care.    Encounter Diagnosis and Orders:  Diagnoses and all orders for this visit:    1. Foot pain, right (Primary)    2. Closed nondisplaced fracture of proximal phalanx of lesser toe of right foot, initial encounter        If you have questions, please do not hesitate to call me. I look forward to following Collins along with you.         Sincerely,        Melvin Fletcher DPM        CC:   No Recipients

## 2024-09-25 NOTE — ASSESSMENT & PLAN NOTE
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 Their depression is currently active and the condition is improving with treatment. This will be reassessed at the next regular appointment. F/U as described:patient will continue current medication therapy.   Opt out